# Patient Record
Sex: MALE | Race: WHITE
[De-identification: names, ages, dates, MRNs, and addresses within clinical notes are randomized per-mention and may not be internally consistent; named-entity substitution may affect disease eponyms.]

---

## 2017-05-06 ENCOUNTER — HOSPITAL ENCOUNTER (INPATIENT)
Dept: HOSPITAL 35 - ER | Age: 62
LOS: 1 days | Discharge: LEFT BEFORE DISCHARGE | DRG: 313 | End: 2017-05-07
Attending: INTERNAL MEDICINE | Admitting: INTERNAL MEDICINE
Payer: COMMERCIAL

## 2017-05-06 VITALS — DIASTOLIC BLOOD PRESSURE: 75 MMHG | SYSTOLIC BLOOD PRESSURE: 173 MMHG

## 2017-05-06 VITALS — HEIGHT: 70 IN | WEIGHT: 179 LBS | BODY MASS INDEX: 25.62 KG/M2

## 2017-05-06 DIAGNOSIS — Z91.040: ICD-10-CM

## 2017-05-06 DIAGNOSIS — E11.65: ICD-10-CM

## 2017-05-06 DIAGNOSIS — I10: ICD-10-CM

## 2017-05-06 DIAGNOSIS — J44.1: ICD-10-CM

## 2017-05-06 DIAGNOSIS — Z95.5: ICD-10-CM

## 2017-05-06 DIAGNOSIS — I25.2: ICD-10-CM

## 2017-05-06 DIAGNOSIS — Z53.21: ICD-10-CM

## 2017-05-06 DIAGNOSIS — Z71.6: ICD-10-CM

## 2017-05-06 DIAGNOSIS — Z79.899: ICD-10-CM

## 2017-05-06 DIAGNOSIS — Z88.6: ICD-10-CM

## 2017-05-06 DIAGNOSIS — Z79.82: ICD-10-CM

## 2017-05-06 DIAGNOSIS — E78.5: ICD-10-CM

## 2017-05-06 DIAGNOSIS — Z91.048: ICD-10-CM

## 2017-05-06 DIAGNOSIS — Z91.041: ICD-10-CM

## 2017-05-06 DIAGNOSIS — Z76.5: ICD-10-CM

## 2017-05-06 DIAGNOSIS — F17.290: ICD-10-CM

## 2017-05-06 DIAGNOSIS — I25.10: ICD-10-CM

## 2017-05-06 DIAGNOSIS — Z88.8: ICD-10-CM

## 2017-05-06 DIAGNOSIS — Z79.4: ICD-10-CM

## 2017-05-06 DIAGNOSIS — Z88.0: ICD-10-CM

## 2017-05-06 DIAGNOSIS — R07.89: Primary | ICD-10-CM

## 2017-05-06 LAB
ANION GAP SERPL CALC-SCNC: 8 MMOL/L (ref 7–16)
BASOPHILS NFR BLD AUTO: 1.9 % (ref 0–2)
BUN SERPL-MCNC: 10 MG/DL (ref 7–18)
CALCIUM SERPL-MCNC: 8.9 MG/DL (ref 8.5–10.1)
CHLORIDE SERPL-SCNC: 104 MMOL/L (ref 98–107)
CO2 SERPL-SCNC: 25 MMOL/L (ref 21–32)
CREAT SERPL-MCNC: 0.9 MG/DL (ref 0.7–1.3)
EOSINOPHIL NFR BLD: 4.8 % (ref 0–3)
ERYTHROCYTE [DISTWIDTH] IN BLOOD BY AUTOMATED COUNT: 15.9 % (ref 10.5–14.5)
GLUCOSE SERPL-MCNC: 242 MG/DL (ref 74–106)
GRANULOCYTES NFR BLD MANUAL: 50.7 % (ref 36–66)
HCT VFR BLD CALC: 38.6 % (ref 42–52)
HGB BLD-MCNC: 12.8 GM/DL (ref 14–18)
LYMPHOCYTES NFR BLD AUTO: 37.8 % (ref 24–44)
MANUAL DIFFERENTIAL PERFORMED BLD QL: NO
MCH RBC QN AUTO: 26.9 PG (ref 26–34)
MCHC RBC AUTO-ENTMCNC: 33.1 G/DL (ref 28–37)
MCV RBC: 81 FL (ref 80–100)
MONOCYTES NFR BLD: 4.8 % (ref 1–8)
NEUTROPHILS # BLD: 4.3 THOU/UL (ref 1.4–8.2)
PLATELET # BLD: 413 THOU/UL (ref 150–400)
POTASSIUM SERPL-SCNC: 4.2 MMOL/L (ref 3.5–5.1)
RBC # BLD AUTO: 4.76 MIL/UL (ref 4.5–6)
SODIUM SERPL-SCNC: 138 MMOL/L (ref 136–145)
TROPONIN I SERPL-MCNC: < 0.04 NG/ML
WBC # BLD AUTO: 8.5 THOU/UL (ref 4–11)

## 2017-05-06 PROCEDURE — 10045: CPT

## 2017-05-07 VITALS — DIASTOLIC BLOOD PRESSURE: 69 MMHG | SYSTOLIC BLOOD PRESSURE: 114 MMHG

## 2017-05-07 VITALS — SYSTOLIC BLOOD PRESSURE: 140 MMHG | DIASTOLIC BLOOD PRESSURE: 86 MMHG

## 2017-05-07 VITALS — SYSTOLIC BLOOD PRESSURE: 140 MMHG | DIASTOLIC BLOOD PRESSURE: 69 MMHG

## 2017-05-07 VITALS — SYSTOLIC BLOOD PRESSURE: 150 MMHG | DIASTOLIC BLOOD PRESSURE: 73 MMHG

## 2017-05-07 LAB
APTT BLD: 26.3 SECONDS (ref 24.5–32.8)
CK-MB MASS: 7.6 NG/ML
INR PPP: 1
PROTHROMBIN TIME: 10.2 SECONDS (ref 9.3–11.4)
TROPONIN I SERPL-MCNC: < 0.04 NG/ML

## 2017-05-27 ENCOUNTER — HOSPITAL ENCOUNTER (INPATIENT)
Dept: HOSPITAL 61 - ER | Age: 62
LOS: 1 days | Discharge: HOME | DRG: 392 | End: 2017-05-28
Attending: INTERNAL MEDICINE | Admitting: INTERNAL MEDICINE
Payer: MEDICAID

## 2017-05-27 VITALS — WEIGHT: 180 LBS | BODY MASS INDEX: 25.77 KG/M2 | HEIGHT: 70 IN

## 2017-05-27 DIAGNOSIS — Z88.0: ICD-10-CM

## 2017-05-27 DIAGNOSIS — E44.0: ICD-10-CM

## 2017-05-27 DIAGNOSIS — Z88.8: ICD-10-CM

## 2017-05-27 DIAGNOSIS — Z95.5: ICD-10-CM

## 2017-05-27 DIAGNOSIS — I25.119: ICD-10-CM

## 2017-05-27 DIAGNOSIS — Z88.5: ICD-10-CM

## 2017-05-27 DIAGNOSIS — E11.9: ICD-10-CM

## 2017-05-27 DIAGNOSIS — I50.9: ICD-10-CM

## 2017-05-27 DIAGNOSIS — E78.5: ICD-10-CM

## 2017-05-27 DIAGNOSIS — K21.9: Primary | ICD-10-CM

## 2017-05-27 DIAGNOSIS — Z86.73: ICD-10-CM

## 2017-05-27 DIAGNOSIS — I11.0: ICD-10-CM

## 2017-05-27 DIAGNOSIS — Z79.82: ICD-10-CM

## 2017-05-27 DIAGNOSIS — Z82.49: ICD-10-CM

## 2017-05-27 LAB
ALBUMIN SERPL-MCNC: 3.1 G/DL (ref 3.4–5)
ALP SERPL-CCNC: 87 U/L (ref 46–116)
ALT SERPL-CCNC: 20 U/L (ref 16–63)
ANION GAP SERPL CALC-SCNC: 10 MMOL/L (ref 6–14)
AST SERPL-CCNC: 12 U/L (ref 15–37)
BASOPHILS # BLD AUTO: 0.1 X10^3/UL (ref 0–0.2)
BASOPHILS NFR BLD: 1 % (ref 0–3)
BILIRUB DIRECT SERPL-MCNC: 0.1 MG/DL (ref 0–0.2)
BILIRUB SERPL-MCNC: 0.1 MG/DL (ref 0.2–1)
BUN SERPL-MCNC: 7 MG/DL (ref 8–26)
CALCIUM SERPL-MCNC: 9 MG/DL (ref 8.5–10.1)
CHLORIDE SERPL-SCNC: 106 MMOL/L (ref 98–107)
CO2 SERPL-SCNC: 28 MMOL/L (ref 21–32)
CREAT SERPL-MCNC: 0.9 MG/DL (ref 0.7–1.3)
EOSINOPHIL NFR BLD: 6 % (ref 0–3)
ERYTHROCYTE [DISTWIDTH] IN BLOOD BY AUTOMATED COUNT: 15.7 % (ref 11.5–14.5)
GFR SERPLBLD BASED ON 1.73 SQ M-ARVRAT: 85.8 ML/MIN
GLUCOSE SERPL-MCNC: 231 MG/DL (ref 70–99)
HCT VFR BLD CALC: 38.5 % (ref 39–53)
HGB BLD-MCNC: 12.4 G/DL (ref 13–17.5)
LYMPHOCYTES # BLD: 2.9 X10^3/UL (ref 1–4.8)
LYMPHOCYTES NFR BLD AUTO: 42 % (ref 24–48)
MCH RBC QN AUTO: 27 PG (ref 25–35)
MCHC RBC AUTO-ENTMCNC: 32 G/DL (ref 31–37)
MCV RBC AUTO: 83 FL (ref 79–100)
MONOCYTES NFR BLD: 6 % (ref 0–9)
NEUTROPHILS NFR BLD AUTO: 46 % (ref 31–73)
PLATELET # BLD AUTO: 439 X10^3/UL (ref 140–400)
POTASSIUM SERPL-SCNC: 4.1 MMOL/L (ref 3.5–5.1)
PROT SERPL-MCNC: 7.3 G/DL (ref 6.4–8.2)
RBC # BLD AUTO: 4.65 X10^6/UL (ref 4.3–5.7)
SODIUM SERPL-SCNC: 144 MMOL/L (ref 136–145)
WBC # BLD AUTO: 6.9 X10^3/UL (ref 4–11)

## 2017-05-27 PROCEDURE — 85027 COMPLETE CBC AUTOMATED: CPT

## 2017-05-27 PROCEDURE — 96376 TX/PRO/DX INJ SAME DRUG ADON: CPT

## 2017-05-27 PROCEDURE — 84484 ASSAY OF TROPONIN QUANT: CPT

## 2017-05-27 PROCEDURE — 80048 BASIC METABOLIC PNL TOTAL CA: CPT

## 2017-05-27 PROCEDURE — 71010: CPT

## 2017-05-27 PROCEDURE — 80076 HEPATIC FUNCTION PANEL: CPT

## 2017-05-27 PROCEDURE — 93005 ELECTROCARDIOGRAM TRACING: CPT

## 2017-05-27 PROCEDURE — 83690 ASSAY OF LIPASE: CPT

## 2017-05-27 PROCEDURE — 96374 THER/PROPH/DIAG INJ IV PUSH: CPT

## 2017-05-27 PROCEDURE — 83880 ASSAY OF NATRIURETIC PEPTIDE: CPT

## 2017-05-27 PROCEDURE — 82962 GLUCOSE BLOOD TEST: CPT

## 2017-05-27 PROCEDURE — 96375 TX/PRO/DX INJ NEW DRUG ADDON: CPT

## 2017-05-27 PROCEDURE — 36415 COLL VENOUS BLD VENIPUNCTURE: CPT

## 2017-05-27 RX ADMIN — ENOXAPARIN SODIUM SCH MG: 80 INJECTION SUBCUTANEOUS at 23:35

## 2017-05-27 RX ADMIN — MORPHINE SULFATE PRN MG: 4 INJECTION, SOLUTION INTRAMUSCULAR; INTRAVENOUS at 21:40

## 2017-05-27 RX ADMIN — MORPHINE SULFATE PRN MG: 4 INJECTION, SOLUTION INTRAMUSCULAR; INTRAVENOUS at 22:34

## 2017-05-27 RX ADMIN — MORPHINE SULFATE PRN MG: 4 INJECTION, SOLUTION INTRAMUSCULAR; INTRAVENOUS at 21:57

## 2017-05-27 RX ADMIN — MORPHINE SULFATE PRN MG: 4 INJECTION, SOLUTION INTRAMUSCULAR; INTRAVENOUS at 23:28

## 2017-05-27 RX ADMIN — MORPHINE SULFATE PRN MG: 4 INJECTION, SOLUTION INTRAMUSCULAR; INTRAVENOUS at 22:14

## 2017-05-28 VITALS — SYSTOLIC BLOOD PRESSURE: 122 MMHG | DIASTOLIC BLOOD PRESSURE: 48 MMHG

## 2017-05-28 VITALS — SYSTOLIC BLOOD PRESSURE: 124 MMHG | DIASTOLIC BLOOD PRESSURE: 78 MMHG

## 2017-05-28 VITALS — DIASTOLIC BLOOD PRESSURE: 71 MMHG | SYSTOLIC BLOOD PRESSURE: 109 MMHG

## 2017-05-28 VITALS — DIASTOLIC BLOOD PRESSURE: 67 MMHG | SYSTOLIC BLOOD PRESSURE: 104 MMHG

## 2017-05-28 LAB
ANION GAP SERPL CALC-SCNC: 8 MMOL/L (ref 6–14)
BASOPHILS # BLD AUTO: 0.1 X10^3/UL (ref 0–0.2)
BASOPHILS NFR BLD: 1 % (ref 0–3)
BUN SERPL-MCNC: 7 MG/DL (ref 8–26)
CALCIUM SERPL-MCNC: 8.8 MG/DL (ref 8.5–10.1)
CHLORIDE SERPL-SCNC: 110 MMOL/L (ref 98–107)
CO2 SERPL-SCNC: 29 MMOL/L (ref 21–32)
CREAT SERPL-MCNC: 0.8 MG/DL (ref 0.7–1.3)
EOSINOPHIL NFR BLD: 6 % (ref 0–3)
ERYTHROCYTE [DISTWIDTH] IN BLOOD BY AUTOMATED COUNT: 16 % (ref 11.5–14.5)
GFR SERPLBLD BASED ON 1.73 SQ M-ARVRAT: 98.3 ML/MIN
GLUCOSE SERPL-MCNC: 66 MG/DL (ref 70–99)
HCT VFR BLD CALC: 36.9 % (ref 39–53)
HGB BLD-MCNC: 11.8 G/DL (ref 13–17.5)
LYMPHOCYTES # BLD: 3.1 X10^3/UL (ref 1–4.8)
LYMPHOCYTES NFR BLD AUTO: 47 % (ref 24–48)
MCH RBC QN AUTO: 26 PG (ref 25–35)
MCHC RBC AUTO-ENTMCNC: 32 G/DL (ref 31–37)
MCV RBC AUTO: 82 FL (ref 79–100)
MONOCYTES NFR BLD: 4 % (ref 0–9)
NEUTROPHILS NFR BLD AUTO: 41 % (ref 31–73)
PLATELET # BLD AUTO: 452 X10^3/UL (ref 140–400)
POTASSIUM SERPL-SCNC: 4.8 MMOL/L (ref 3.5–5.1)
RBC # BLD AUTO: 4.49 X10^6/UL (ref 4.3–5.7)
SODIUM SERPL-SCNC: 147 MMOL/L (ref 136–145)
WBC # BLD AUTO: 6.7 X10^3/UL (ref 4–11)

## 2017-05-28 RX ADMIN — INSULIN ASPART SCH UNITS: 100 INJECTION, SOLUTION INTRAVENOUS; SUBCUTANEOUS at 12:00

## 2017-05-28 RX ADMIN — MORPHINE SULFATE PRN MG: 4 INJECTION, SOLUTION INTRAMUSCULAR; INTRAVENOUS at 03:14

## 2017-05-28 RX ADMIN — INSULIN ASPART SCH UNITS: 100 INJECTION, SOLUTION INTRAVENOUS; SUBCUTANEOUS at 08:00

## 2017-05-28 RX ADMIN — MORPHINE SULFATE PRN MG: 4 INJECTION, SOLUTION INTRAMUSCULAR; INTRAVENOUS at 01:22

## 2017-05-28 RX ADMIN — MORPHINE SULFATE PRN MG: 4 INJECTION, SOLUTION INTRAMUSCULAR; INTRAVENOUS at 12:05

## 2017-05-28 RX ADMIN — ENOXAPARIN SODIUM SCH MG: 80 INJECTION SUBCUTANEOUS at 08:52

## 2017-05-28 RX ADMIN — MORPHINE SULFATE PRN MG: 4 INJECTION, SOLUTION INTRAMUSCULAR; INTRAVENOUS at 09:36

## 2017-05-28 RX ADMIN — MORPHINE SULFATE PRN MG: 4 INJECTION, SOLUTION INTRAMUSCULAR; INTRAVENOUS at 07:48

## 2017-05-28 RX ADMIN — MORPHINE SULFATE PRN MG: 4 INJECTION, SOLUTION INTRAMUSCULAR; INTRAVENOUS at 05:39

## 2017-05-28 NOTE — PDOC2
CONSULT


Date of Consult


Date of Consult


DATE: 5/28/17 


TIME: 10:46





Reason for Consult


Reason for Consult:


Chest pain





Referring Physician


Referring Physician:


Dr. Page





Identification/Chief Complaint


Chief Complaint


Chest pain





Source


Source:  Chart review, Patient





History of Present Illness


Reason for Visit:


61-year-old male with history of coronary artery disease s/p PCI/stent in 2008 

who usually follows up with Dr. Carrillo at [facility] presented 

complaining of left-sided chest pain that started 15 minutes prior to 

presentation while he was shopping. He described the pain as sharp and pressure-

like at the same time and also stated the pain was different than the pain he 

had prior to his angioplasty. He denied any orthopnea/PND, palpitations or 

syncope.





Past Medical History


Cardiovascular:  CAD, HTN, Hyperlipidemia


Musculoskeletal:   low back pain


Endocrine:  Diabetes





Past Surgical History


Past Surgical History:  Other





Family History


Family History:  Hypertension





Social History


ALCOHOL:  occassional





Current Problem List


Problem List


Problems


Medical Problems:


(1) Chest pain


Status: Acute  











Current Medications


Current Medications





Current Medications


Morphine Sulfate 4 mg PRN Q15MIN  PRN IV/SQ PAIN GREATER THAN 3/10 Last 

administered on 5/27/17at 22:34;  Start 5/27/17 at 21:30;  Stop 5/28/17 at 21:29


Morphine Sulfate 4 mg STK-MED ONCE .ROUTE ;  Start 5/27/17 at 21:37;  Stop 5/27/ 17 at 21:38;  Status DC


Ondansetron HCl (Zofran) 4 mg 1X  ONCE IV  Last administered on 5/27/17at 21:56

;  Start 5/27/17 at 22:00;  Stop 5/27/17 at 22:01;  Status DC


Ondansetron HCl (Zofran) 4 mg PRN Q8HRS  PRN IV NAUSEA/VOMITING Last 

administered on 5/27/17at 23:34;  Start 5/27/17 at 22:45;  Stop 5/28/17 at 22:44


Morphine Sulfate 4 mg PRN Q2HR  PRN IV SEVERE PAIN Last administered on 5/28/ 17at 09:36;  Start 5/27/17 at 22:45;  Stop 5/28/17 at 22:44


Acetaminophen (Tylenol) 650 mg PRN Q4HRS  PRN PO FEVER;  Start 5/27/17 at 22:45

;  Stop 5/28/17 at 22:44


Insulin Aspart (NovoLOG) 0-5 UNITS TIDWMEALS SQ ;  Start 5/28/17 at 08:00


Dextrose (Dextrose 50%-Water Syringe) 12.5 gm PRN Q15MIN  PRN IV SEE COMMENTS;  

Start 5/27/17 at 22:45


Enoxaparin Sodium (Lovenox Per Pharmacy Treatment Dosing) 1 each PRN DAILY  PRN 

MC DVT TREATMENT;  Start 5/27/17 at 23:30


Enoxaparin Sodium (Lovenox 80mg Syringe) 80 mg Q12HR SQ  Last administered on 5/ 28/17at 08:52;  Start 5/27/17 at 23:45


Info (Anti-Coagulation Monitoring By Pharmacy) 1 each PRN DAILY  PRN MC SEE 

COMMENTS Last administered on 5/28/17at 00:55;  Start 5/27/17 at 23:45





Active Scripts


Active


Reported


Lisinopril 5 Mg Tablet 5 Mg PO DAILY


Aspir 81 (Aspirin) 81 Mg Tablet.dr 1 Tab PO DAILY


Zoloft (Sertraline Hcl) 25 Mg Tablet 25 Mg PO DAILY


Clopidogrel (Clopidogrel Bisulfate) 75 Mg Tablet 1 Tab PO DAILY


Symbicort 80-4.5 Mcg Inhaler (Budesonide/Formoterol Fumarate) 10.2 Gm 

Hfa.aer.ad 2 Puff IH BID


Lantus (Insulin Glargine,Hum.rec.anlog) 100 Unit/1 Ml Vial 45 Unit SQ HS


Humulin N Kwikpen (Nph, Human Insulin Isophane) 100 Unit/1 Ml Insuln.pen 10 

Unit SQ TIDAC


Metoprolol Tartrate 25 Mg Tablet 25 Mg PO BID


Gabapentin 300 Mg Capsule 300 Mg PO Q6HRS


Flomax (Tamsulosin Hcl) 0.4 Mg Cap.er.24h 0.4 Mg PO QHS


Metformin Hcl 1,000 Mg Tablet 1,000 Mg PO BIDWMEALS





Allergies


Allergies:  


Coded Allergies:  


     Iodinated Contrast Media - Oral and (Unverified  Allergy, Severe, 

"Breathing issues", 12/31/15)


     nitroglycerin (Verified  Allergy, Severe, "Pretty much unresponsive right 

away", 1/15/16)


 Low blood pressure


     Penicillins (Verified  Allergy, Intermediate, Hives , 12/31/15)


     codeine (Verified  Allergy, Intermediate, 5/27/17)


     fentanyl (Verified  Allergy, Intermediate, rash, 12/31/15)


     ketorolac (Verified  Allergy, Intermediate, 5/27/17)


     methylprednisolone (Verified  Allergy, Intermediate, 5/27/17)





ROS


PSYCHOLOGICAL ROS:  No: Hallucinations


Eyes:  No Loss of vision


HEENT:  No: Epistaxis


ENDOCRINE:  No: Palpitations


Respiratory:  YES: Shortness of breath, 


   No: Hemoptysis


Cardiovascular:  yes Chest Pain


Gastrointestinal:  No Vomiting, No Diarrhea


Genitourinary:  No Hematuria


Neurological:  No Seizures


Skin:  No Rash





Physical Exam


General:  Alert, Oriented X3


HEENT:  Atraumatic, PERRLA


Lungs:  Clear to auscultation


Heart:  Regular rate, No murmurs


Abdomen:  Soft, No tenderness


Extremities:  No edema


Psych/Mental Status:  Mood NL





Vitals


VITALS





Vital Signs








  Date Time  Temp Pulse Resp B/P (MAP) Pulse Ox O2 Delivery O2 Flow Rate FiO2


 


5/28/17 10:26 98.1 84 19 104/67 (79) 90 Room Air  





 98.1       


 


5/28/17 09:36       2.0 











Labs


Labs





Laboratory Tests








Test


  5/27/17


20:55 5/28/17


03:30 5/28/17


05:42 5/28/17


07:29


 


White Blood Count


  6.9 x10^3/uL


(4.0-11.0) 6.7 x10^3/uL


(4.0-11.0) 


  


 


 


Red Blood Count


  4.65 x10^6/uL


(4.30-5.70) 4.49 x10^6/uL


(4.30-5.70) 


  


 


 


Hemoglobin


  12.4 g/dL


(13.0-17.5) 11.8 g/dL


(13.0-17.5) 


  


 


 


Hematocrit


  38.5 %


(39.0-53.0) 36.9 %


(39.0-53.0) 


  


 


 


Mean Corpuscular Volume 83 fL ()  82 fL ()   


 


Mean Corpuscular Hemoglobin 27 pg (25-35)  26 pg (25-35)   


 


Mean Corpuscular Hemoglobin


Concent 32 g/dL


(31-37) 32 g/dL


(31-37) 


  


 


 


Red Cell Distribution Width


  15.7 %


(11.5-14.5) 16.0 %


(11.5-14.5) 


  


 


 


Platelet Count


  439 x10^3/uL


(140-400) 452 x10^3/uL


(140-400) 


  


 


 


Neutrophils (%) (Auto) 46 % (31-73)  41 % (31-73)   


 


Lymphocytes (%) (Auto) 42 % (24-48)  47 % (24-48)   


 


Monocytes (%) (Auto) 6 % (0-9)  4 % (0-9)   


 


Eosinophils (%) (Auto) 6 % (0-3)  6 % (0-3)   


 


Basophils (%) (Auto) 1 % (0-3)  1 % (0-3)   


 


Neutrophils # (Auto)


  3.1 x10^3uL


(1.8-7.7) 2.8 x10^3uL


(1.8-7.7) 


  


 


 


Lymphocytes # (Auto)


  2.9 x10^3/uL


(1.0-4.8) 3.1 x10^3/uL


(1.0-4.8) 


  


 


 


Monocytes # (Auto)


  0.4 x10^3/uL


(0.0-1.1) 0.3 x10^3/uL


(0.0-1.1) 


  


 


 


Eosinophils # (Auto)


  0.4 x10^3/uL


(0.0-0.7) 0.4 x10^3/uL


(0.0-0.7) 


  


 


 


Basophils # (Auto)


  0.1 x10^3/uL


(0.0-0.2) 0.1 x10^3/uL


(0.0-0.2) 


  


 


 


Sodium Level


  144 mmol/L


(136-145) 147 mmol/L


(136-145) 


  


 


 


Potassium Level


  4.1 mmol/L


(3.5-5.1) 4.8 mmol/L


(3.5-5.1) 


  


 


 


Chloride Level


  106 mmol/L


() 110 mmol/L


() 


  


 


 


Carbon Dioxide Level


  28 mmol/L


(21-32) 29 mmol/L


(21-32) 


  


 


 


Anion Gap 10 (6-14)  8 (6-14)   


 


Blood Urea Nitrogen 7 mg/dL (8-26)  7 mg/dL (8-26)   


 


Creatinine


  0.9 mg/dL


(0.7-1.3) 0.8 mg/dL


(0.7-1.3) 


  


 


 


Estimated GFR


(Cockcroft-Gault) 85.8 


  98.3 


  


  


 


 


Glucose Level


  231 mg/dL


(70-99) 66 mg/dL


(70-99) 


  


 


 


Calcium Level


  9.0 mg/dL


(8.5-10.1) 8.8 mg/dL


(8.5-10.1) 


  


 


 


Total Bilirubin


  0.1 mg/dL


(0.2-1.0) 


  


  


 


 


Direct Bilirubin


  0.1 mg/dL


(0.0-0.2) 


  


  


 


 


Aspartate Amino Transf


(AST/SGOT) 12 U/L (15-37) 


  


  


  


 


 


Alanine Aminotransferase


(ALT/SGPT) 20 U/L (16-63) 


  


  


  


 


 


Alkaline Phosphatase


  87 U/L


() 


  


  


 


 


Troponin I Quantitative


  < 0.017 ng/mL


(0.000-0.055) 0.021 ng/mL


(0.000-0.055) 


  


 


 


NT-Pro-B-Type Natriuretic


Peptide 337 pg/mL


(0-124) 


  


  


 


 


Total Protein


  7.3 g/dL


(6.4-8.2) 


  


  


 


 


Albumin


  3.1 g/dL


(3.4-5.0) 


  


  


 


 


Lipase


  102 U/L


() 


  


  


 


 


Glucose (Fingerstick)


  


  


  88 mg/dL


(70-99) 70 mg/dL


(70-99)


 


Test


  5/28/17


09:00 


  


  


 


 


Troponin I Quantitative


  0.026 ng/mL


(0.000-0.055) 


  


  


 








Laboratory Tests








Test


  5/27/17


20:55 5/28/17


03:30 5/28/17


05:42 5/28/17


07:29


 


White Blood Count


  6.9 x10^3/uL


(4.0-11.0) 6.7 x10^3/uL


(4.0-11.0) 


  


 


 


Red Blood Count


  4.65 x10^6/uL


(4.30-5.70) 4.49 x10^6/uL


(4.30-5.70) 


  


 


 


Hemoglobin


  12.4 g/dL


(13.0-17.5) 11.8 g/dL


(13.0-17.5) 


  


 


 


Hematocrit


  38.5 %


(39.0-53.0) 36.9 %


(39.0-53.0) 


  


 


 


Mean Corpuscular Volume 83 fL ()  82 fL ()   


 


Mean Corpuscular Hemoglobin 27 pg (25-35)  26 pg (25-35)   


 


Mean Corpuscular Hemoglobin


Concent 32 g/dL


(31-37) 32 g/dL


(31-37) 


  


 


 


Red Cell Distribution Width


  15.7 %


(11.5-14.5) 16.0 %


(11.5-14.5) 


  


 


 


Platelet Count


  439 x10^3/uL


(140-400) 452 x10^3/uL


(140-400) 


  


 


 


Neutrophils (%) (Auto) 46 % (31-73)  41 % (31-73)   


 


Lymphocytes (%) (Auto) 42 % (24-48)  47 % (24-48)   


 


Monocytes (%) (Auto) 6 % (0-9)  4 % (0-9)   


 


Eosinophils (%) (Auto) 6 % (0-3)  6 % (0-3)   


 


Basophils (%) (Auto) 1 % (0-3)  1 % (0-3)   


 


Neutrophils # (Auto)


  3.1 x10^3uL


(1.8-7.7) 2.8 x10^3uL


(1.8-7.7) 


  


 


 


Lymphocytes # (Auto)


  2.9 x10^3/uL


(1.0-4.8) 3.1 x10^3/uL


(1.0-4.8) 


  


 


 


Monocytes # (Auto)


  0.4 x10^3/uL


(0.0-1.1) 0.3 x10^3/uL


(0.0-1.1) 


  


 


 


Eosinophils # (Auto)


  0.4 x10^3/uL


(0.0-0.7) 0.4 x10^3/uL


(0.0-0.7) 


  


 


 


Basophils # (Auto)


  0.1 x10^3/uL


(0.0-0.2) 0.1 x10^3/uL


(0.0-0.2) 


  


 


 


Sodium Level


  144 mmol/L


(136-145) 147 mmol/L


(136-145) 


  


 


 


Potassium Level


  4.1 mmol/L


(3.5-5.1) 4.8 mmol/L


(3.5-5.1) 


  


 


 


Chloride Level


  106 mmol/L


() 110 mmol/L


() 


  


 


 


Carbon Dioxide Level


  28 mmol/L


(21-32) 29 mmol/L


(21-32) 


  


 


 


Anion Gap 10 (6-14)  8 (6-14)   


 


Blood Urea Nitrogen 7 mg/dL (8-26)  7 mg/dL (8-26)   


 


Creatinine


  0.9 mg/dL


(0.7-1.3) 0.8 mg/dL


(0.7-1.3) 


  


 


 


Estimated GFR


(Cockcroft-Gault) 85.8 


  98.3 


  


  


 


 


Glucose Level


  231 mg/dL


(70-99) 66 mg/dL


(70-99) 


  


 


 


Calcium Level


  9.0 mg/dL


(8.5-10.1) 8.8 mg/dL


(8.5-10.1) 


  


 


 


Total Bilirubin


  0.1 mg/dL


(0.2-1.0) 


  


  


 


 


Direct Bilirubin


  0.1 mg/dL


(0.0-0.2) 


  


  


 


 


Aspartate Amino Transf


(AST/SGOT) 12 U/L (15-37) 


  


  


  


 


 


Alanine Aminotransferase


(ALT/SGPT) 20 U/L (16-63) 


  


  


  


 


 


Alkaline Phosphatase


  87 U/L


() 


  


  


 


 


Troponin I Quantitative


  < 0.017 ng/mL


(0.000-0.055) 0.021 ng/mL


(0.000-0.055) 


  


 


 


NT-Pro-B-Type Natriuretic


Peptide 337 pg/mL


(0-124) 


  


  


 


 


Total Protein


  7.3 g/dL


(6.4-8.2) 


  


  


 


 


Albumin


  3.1 g/dL


(3.4-5.0) 


  


  


 


 


Lipase


  102 U/L


() 


  


  


 


 


Glucose (Fingerstick)


  


  


  88 mg/dL


(70-99) 70 mg/dL


(70-99)


 


Test


  5/28/17


09:00 


  


  


 


 


Troponin I Quantitative


  0.026 ng/mL


(0.000-0.055) 


  


  


 











Assessment/Plan


Assessment/Plan


1.  Chest pain with atypical features:  EKG without acute changes. Patient has 

history of coronary artery disease and has undergone stent placement in 2008. 

He stated that he has seen his primary cardiologist at [facility] 

recently. We will obtain medical records. He will benefit from Lexiscan nuclear 

stress test to rule out ischemic etiology if this has not been done recently - 

he could get this done as an outpatient.


2. Hypertension:  Controlled


3.  Diabetes mellitus type 2:  Treat per IM





Thank you for your consultation 











QUIQUE HERRERA MD May 28, 2017 10:46

## 2017-05-28 NOTE — RAD
EXAM: Chest, single view.



HISTORY: Chest pain.



COMPARISON: 1/5/2016.



FINDINGS: A frontal view of the chest is obtained. There is bilateral basilar

atelectasis. There is no infiltrate, effusion or pneumothorax. The heart is

normal in size.



IMPRESSION: Bilateral basilar atelectasis.

## 2017-05-28 NOTE — EKG
[facility]

               [address] [place] [address]

Test Date:    2017               Test Time:    21:50:24

Pat Name:     BYRANNA CLAIRE              Department:   

Patient ID:   [id number]           Room:         250 1

Gender:       M                        Technician:   

:          1955               Requested By: MEME HARRISON

Order Number: [id number]            Reading MD:   Susi Batista

                                 Measurements

Intervals                              Axis          

Rate:         88                       P:            46

UT:           182                      QRS:          62

QRSD:         86                       T:            95

QT:           386                                    

QTc:          471                                    

                           Interpretive Statements

SINUS RHYTHM



QRS(T) CONTOUR ABNORMALITY

CANNOT RULE OUT ANTEROSEPTAL MYOCARDIAL DAMAGE



Electronically Signed On 2017 15:55:22 CDT by Susi Batista

## 2017-05-28 NOTE — ACF
Admit Criteria Forms


                                                CHEST PAIN





Clinical Indications for Admission to Inpatient Care





                                                                         (Place 

'X' for any and all applicable criteria): 





Admission is indicated for chest pain and ANY ONE of the following(1)(2)(3)(4)(5

): 





[X ]I.    Angina with acute coronary syndrome (Also use Myocardial Infarction 

or Angina guideline)


[ ]II.   Hemodynamic instability


[ ]III.  Angina needing acute intervention as indicated by ALL of the following(

11)(12):


        [ ]a)  Unstable angina is present as indicated by angina that is ANY ONE

 of the following:


                [ ]i)     New onset   


                [ ]ii)    Nocturnal   


                [ ]iii)   Prolonged at rest   


                [ ]iv)   Progressive


        [ ]b)  Angina warrants acute intervention as indicated by ANY ONE of 

the following:


                [ ]i)     Recurrent angina (e.g, not responding as previously 

to treatment)


                [ ]ii)     Angina at rest or with low-level activities despite 

initial medical therapy


                [ ]iii)    New or presumably new ST-segment depression on ECG


                [ ]iv)    Signs or symptoms of heart failure (eg, dyspnea, 

pulmonary edema)


                [ ]v)     New or worsening mitral regurgitation


                [ ]vi)    Hemodynamic instability


                [ ]vii)   Dangerous arrhythmia (eg, sustained ventricular 

tachycardia)          


                [ ]viii)   History of percutaneous coronary intervention within 

6 months          


                [ ]ix)    History of coronary artery bypass graft surgery


                [ ]x)    ANTHONY risk score of 2 or greater[A]


                [ ]xi)    History of Diabetes(14)


                [ ]xii)   High-risk cardiac ischemia findings on noninvasive 

testing (e.g, echocardiogram,


                          treadmill testing, nuclear scan)


                [ ]xiii)  Chronic renal insufficiency (ie, estimated GFR less 

than 60 mL/min/1.732m)


                [ ]xiv)  Left ventricular ejection fraction less than 40%


              


[ ]IV.   Evidence of MI (eg, cardiac biomarkers positive, ST-segment elevation 

on ECG) also use Myocardial Infarction Criteria Form.


[ ]V.    Pulmonary edema 


[ ]VI.   Respiratory distress


[ ]VII.  Chest pain indicative of serious diagnosis other than coronary artery 

disease (eg, aortic dissection)





[ ]VIII. Contraindications and/or Inappropriate clinical situations for 

Observational Care in patients


          with Chest Pain, when ANY ONE of the following is required:


          [ ]a)   Patient with risk factor for pulmonary embolism, acute 

coronary syndrome and myocardial infarction (18)


          [ ]b)   Patient with Pulmonary embolism require an average LOS of 4.3 

days, therefore emergency 


                   department observation management is inappropriate 18,23


          [ ]c)   Painful condition/s in the elderly, have the highest rate of 

recidivism after emergency department observation management (10.8%)  20,21,22


          [ ]d)   Elevated cardiac biomarker requires intensive and exhaustive 

care (19)


[ ]IX.  General contraindications and/or Inappropriate clinical situations for 

Observational Care in patients


          with Chest Pain, when ANY ONE of the following is required:


           [ ]a)   Prediction of prolongation of LOS based on ANY ONE of the 

following may be considered as 


                    a contraindication for observational care 2, 3, 4, 5, 6, 7, 

8, 9, 10, 11


                    [ ]i)    Age > 65 yrs.


                    [ ]ii)   Patient arriving by ambulance


                    [ ]iii)  Patient with high acuity


                    [ ]iv)  Patient requiring vital sign monitoring


                    [ ]v)   Patient on IV medication


           [ ]b)   Systolic blood pressures  180mmHg  3,12


           [ ]c)   Patient with altered mental status including delirium and 

other alteration of consciousness, (3)


           [ ]d)   Patient whose discharge disposition will be to a skilled 

nursing home or rehabilitation home should 


                    not be managed in Emergency Department Observation Unit. 

CMS rule requires 3 days hospital stay before such placement. 3,13


           [ ]e)   Patient with failure to thrive due to broad array of 

etiologies  3,16,17


           [ ]f)    Inability to ambulate  3,14








Extended stay beyond goal length of stay may be needed for (1)(28):


[ ]a)   Specific condition diagnosed after evaluation (eg, pulmonary embolism, 

aortic dissection) 


[ ]b)   Unstable angina


[ ]c)   Continued suspicion of acute coronary syndrome with inability to 

complete needed cardiac evaluation


         (eg, patient clinically unable to undergo stress testing)


[ ]d)   Myocardial infarction








(Contents from ANGINA and CHEST PAIN clinical indications for admission to 

inpatient care have been integrated in this form) 








The original [employer] content created by [employer] has been revised. 


The portions of the content which have been revised are identified through the 

use of italic text or in bold, and [facility]


has neither reviewed nor approved the modified material. All other unmodified 

content is copyright  [employer].





Please see references footnoted in the original [facility] [place] edition 

2016











GREGORIA ESCOBAR May 28, 2017 00:28

## 2017-05-28 NOTE — SSS
ADMIT DATE:  05/28/2017



CHIEF COMPLAINT:  Chest pain.



HISTORY OF PRESENT ILLNESS:  The patient is a pleasant 61-year-old male with

known coronary artery disease.  He has got two previous stents in 2008.  He

present today with chest pain, rated 7/10.  He has got associated weakness.  He

has now been admitted to telemetry floor, was consulted Dr. Morales.  He saw

the patient _____ the patient could probably go home and see his cardiologist.



PAST MEDICAL HISTORY:  Coronary artery disease, previous stents, stroke, CHF,

diabetes, hyperlipidemia, hypertension, left ankle surgery, exploratory

abdominal surgery.



ALLERGIES:  IODINE, PENICILLIN, CODEINE, FENTANYL, _____, METHYLPREDNISONE AND

NITRO.



FAMILY HISTORY:  Coronary artery disease.



SOCIAL HISTORY:  He does not drink, smoke or take drugs.



MEDICATIONS:  Reviewed, please refer to the MRAD.



REVIEW OF SYSTEMS:

GENERAL:  No history of weight change, weakness or fevers.

SKIN:  No bruising, hair changes or rashes.

EYES:  No blurred, double or loss of vision.

NOSE AND THROAT:  No history of nosebleeds, hoarseness or sore throat.

HEART:  No history of palpitations, chest pain or shortness of breath on

exertion.

LUNGS:  Denies cough, hemoptysis, wheezing or shortness of breath.

GASTROINTESTINAL:  Denies changes in appetite, nausea, vomiting, diarrhea or

constipation.

GENITOURINARY:  No history of frequency, urgency, hesitancy or nocturia.

NEUROLOGIC:  Denies history of numbness, tingling, tremor or weakness.

PSYCHIATRIC:  No history of panic, anxiety or depression.

ENDOCRINE:  No history of heat or cold intolerance, polyuria or polydipsia.

EXTREMITIES:  Denies muscle weakness, joint pain, pain on walking or stiffness.



PHYSICAL EXAMINATION:

VITAL SIGNS:  Temperature afebrile, pulse 62, respirations 18, blood pressure

104/67.

GENERAL:  He is alert, cooperative.

HEART:  Normal S1, S2.

LUNGS:  Clear.

ABDOMEN:  Soft, positive bowel sounds, obese.

EXTREMITIES:  1+ edema.

SKIN:  No rashes.

PSYCHIATRIC:  He is stable.

VASCULAR:  Good capillary refill.

ENDOCRINE:  No thyromegaly.

LYMPHATICS:  No cervical nodes.

HEMATOPOIETIC:  No bruising.



LABORATORY DATA:  White count 6.9, hemoglobin 12, platelets 439.  Electrolytes

pending.  Troponin 0.026.



ASSESSMENT AND PLAN:  Chest pain with known coronary artery disease, suspect

reflux versus possible mild angina.  Clinically, the patient is doing well. 

Agree with Dr. Morales, he could probably go home.  We will have him see his

doctor in a week.



DISPOSITION:  Home.



ACTIVITY:  As tolerated.



DIET:  Low sodium.



MEDICATIONS:  Please see the MRAD.



TOTAL TIME:  31 minutes.

 



______________________________

SAMEER GERMAIN DO



DR:  NOHEMY/tisha  JOB#:  [id number] / [id number]

DD:  05/28/2017 13:06  DT:  05/28/2017 13:31

## 2017-05-28 NOTE — EKG
[facility]

               [address] [place] [address]

Test Date:    2017               Test Time:    20:53:09

Pat Name:     BRYANNA CLAIRE              Department:   

Patient ID:   [id number]           Room:         250 1

Gender:       M                        Technician:   

:          1955               Requested By: MEME HARRISON

Order Number: [id number]            Reading MD:   Susi Batista

                                 Measurements

Intervals                              Axis          

Rate:         99                       P:            60

FL:           184                      QRS:          73

QRSD:         86                       T:            70

QT:           354                                    

QTc:          460                                    

                           Interpretive Statements

SINUS RHYTHM

INCOMPLETE RIGHT BUNDLE BRANCH BLOCK

T ABNORMALITY IN ANTERIOR LEADS



Electronically Signed On 2017 15:54:35 CDT by Susi Batista

## 2017-05-28 NOTE — ED.ADGEN
Past Medical History


Past Medical History:  CHF, CVA, Diabetes-Type II, Hypertension, MI


Past Surgical History:  Angioplasty


Additional Past Surgical Histo:  Left ankle, exploritory abd, cardiac stent


Alcohol Use:  None


Drug Use:  None





Adult General


Chief Complaint


Chief Complaint:  CHEST PAIN





HPI


HPI





Patient is a 61  year old man, history of CHF, CAD status post stent placement 

in , hypertension, hyperlipidemia, type 2 diabetes mellitus, who presents 

emergency Department with a complaint of sudden onset of left-sided chest pain 

radiating into his arm and jaw that began about 15 minutes prior to arrival in 

the emergency department. Patient states that he was shopping in a nearby store

, when he began expressing chest pain. He is brought via EMS to the emergency 

department for evaluation. He states his last stress test and catheterization 

was 10 years ago. He states that he did take aspirin prior to coming to the ED, 

he takes a daily 325 mg aspirin, he took additional 4 baby aspirin prior to 

coming to the ED today. He states that he became "unresponsive" after using 

nitroglycerin previously and was told that he should not use it in the future. 

He currently is complaining of 9 out of 10 pain in his chest, described as 

sharp and a pressure-like sensation, radiating into his arm and jaw. Mild 

shortness of breath and nausea associated with this discomfort. He denies any 

recent travel or surgery, any swelling extremities, any missed doses of 

medication or new medications, and history of DVT or PE, any fevers or chills, 

any cough, any weakness, numbness, tingling, back pain or flank pain, any 

urinary complaints, any preceding symptoms.





Review of Systems


Review of Systems


Constitutional:  Denies fever or chills. []


Eyes:  Denies change in visual acuity. []


HENT:  Denies nasal congestion or sore throat. [] 


Respiratory:  Denies cough, shortness of breath associated with chest pain.


Cardiovascular: Left-sided chest pain radiating into the left jaw and left arm. 

No edema. Associated with mild shortness of breath and lightheadedness.


GI:  Denies abdominal pain, nausea, vomiting, bloody stools or diarrhea. [] 


:  Denies dysuria. [] 


Musculoskeletal:  Denies back pain or joint pain. [] 


Integument:  Denies rash. [] 


Neurologic:  Denies headache, focal weakness or sensory changes. [] 


Endocrine:  Denies polyuria or polydipsia. [] 


Lymphatic:  Denies swollen glands. [] 


Psychiatric:  Denies depression or anxiety. []





Current Medications


Current Medications





Current Medications








 Medications


  (Trade)  Dose


 Ordered  Sig/Yennifer  Start Time


 Stop Time Status Last Admin


Dose Admin


 


 Morphine Sulfate  4 mg  STK-MED ONCE  17 21:37


 17 21:38 DC  


 


 


 Ondansetron HCl


  (Zofran)  4 mg  1X  ONCE  17 22:00


 17 22:01 DC 17 21:56


4 MG











Allergies


Allergies





Allergies








Coded Allergies Type Severity Reaction Last Updated Verified


 


  Iodinated Contrast Media - Oral and Allergy Severe "Breathing issues" 12/31/

15 No


 


  nitroglycerin Allergy Severe "Pretty much unresponsive right away" 1/15/16 Yes


 


  Penicillins Allergy Intermediate Hives  12/31/15 Yes


 


  codeine Allergy Intermediate  17 Yes


 


  fentanyl Allergy Intermediate rash 12/31/15 Yes


 


  ketorolac Allergy Intermediate  17 Yes


 


  methylprednisolone Allergy Intermediate  17 Yes











Physical Exam


Physical Exam





Constitutional: Well developed, well nourished, no acute distress, non-toxic 

appearance. []


HENT: Normocephalic, atraumatic, bilateral external ears normal, oropharynx 

moist, no oral exudates, nose normal. []


Eyes: PERRLA, EOMI, conjunctiva normal, no discharge. [] 


Neck: Normal range of motion, no tenderness, supple, no stridor. [] 


Cardiovascular:Heart rate regular rhythm, no murmur , S1, S2, rubs or gallops. [

]


Lungs & Thorax:  Bilateral breath sounds clear to auscultation , no wheezing, 

rhonchi, rales. No chest wall tenderness or crepitus. Unable to reproduce 

symptoms with palpation. []


Abdomen: Bowel sounds normal, soft, no tenderness, no rebound, rigidity, no 

guarding, no masses, no pulsatile masses. [] 


Skin: Warm, dry, no erythema, no rash. [] 


Back: No tenderness, no CVA tenderness. [] 


Extremities: No tenderness, no cyanosis, no clubbing, ROM intact, no edema. 

Negative Homans sign. [] 


Neurologic: Alert and oriented X 3, normal motor function, normal sensory 

function, no focal deficits noted. []


Psychologic: Affect normal, judgement normal, mood normal. []





Current Patient Data


Vital Signs





 Vital Signs








  Date Time  Temp Pulse Resp B/P (MAP) Pulse Ox O2 Delivery O2 Flow Rate FiO2


 


17 22:00  86 20 118/61 (80) 95 Room Air  


 


17 20:53 98.7       





 98.7       








Lab Values





 Laboratory Tests








Test


  17


20:55


 


White Blood Count


  6.9 x10^3/uL


(4.0-11.0)


 


Red Blood Count


  4.65 x10^6/uL


(4.30-5.70)


 


Hemoglobin


  12.4 g/dL


(13.0-17.5)  L


 


Hematocrit


  38.5 %


(39.0-53.0)  L


 


Mean Corpuscular Volume


  83 fL ()


 


 


Mean Corpuscular Hemoglobin 27 pg (25-35)  


 


Mean Corpuscular Hemoglobin


Concent 32 g/dL


(31-37)


 


Red Cell Distribution Width


  15.7 %


(11.5-14.5)  H


 


Platelet Count


  439 x10^3/uL


(140-400)  H


 


Neutrophils (%) (Auto) 46 % (31-73)  


 


Lymphocytes (%) (Auto) 42 % (24-48)  


 


Monocytes (%) (Auto) 6 % (0-9)  


 


Eosinophils (%) (Auto) 6 % (0-3)  H


 


Basophils (%) (Auto) 1 % (0-3)  


 


Neutrophils # (Auto)


  3.1 x10^3uL


(1.8-7.7)


 


Lymphocytes # (Auto)


  2.9 x10^3/uL


(1.0-4.8)


 


Monocytes # (Auto)


  0.4 x10^3/uL


(0.0-1.1)


 


Eosinophils # (Auto)


  0.4 x10^3/uL


(0.0-0.7)


 


Basophils # (Auto)


  0.1 x10^3/uL


(0.0-0.2)


 


Sodium Level


  144 mmol/L


(136-145)


 


Potassium Level


  4.1 mmol/L


(3.5-5.1)


 


Chloride Level


  106 mmol/L


()


 


Carbon Dioxide Level


  28 mmol/L


(21-32)


 


Anion Gap 10 (6-14)  


 


Blood Urea Nitrogen


  7 mg/dL (8-26)


L


 


Creatinine


  0.9 mg/dL


(0.7-1.3)


 


Estimated GFR


(Cockcroft-Gault) 85.8  


 


 


Glucose Level


  231 mg/dL


(70-99)  H


 


Calcium Level


  9.0 mg/dL


(8.5-10.1)


 


Total Bilirubin


  0.1 mg/dL


(0.2-1.0)  L


 


Direct Bilirubin


  0.1 mg/dL


(0.0-0.2)


 


Aspartate Amino Transferase


(AST) 12 U/L (15-37)


L


 


Alanine Aminotransferase (ALT)


  20 U/L (16-63)


 


 


Alkaline Phosphatase


  87 U/L


()


 


Troponin I Quantitative


  < 0.017 ng/mL


(0.000-0.055)


 


NT-Pro-B-Type Natriuretic


Peptide 337 pg/mL


(0-124)  H


 


Total Protein


  7.3 g/dL


(6.4-8.2)


 


Albumin


  3.1 g/dL


(3.4-5.0)  L


 


Lipase


  102 U/L


()





 Laboratory Tests


17 20:55








 Laboratory Tests


17 20:55














EKG


EKG


EC: Sinus rhythm, heart rate 99 bpm, incomplete right bundle-branch 

block noted, QTc of 460, , QRS of 86, patient with contour normality is 

noted in the inferior leads, very mild ST depressions noted in lead 2, also in 

V6, no significant elevations noted, abnormal ECG, does not meet STEMI criteria

, with compared to ECG from 1/15/2016, axis is now normalized, mild depression 

is new, but contour normality is in the anterior septal leads are consistent. 

Does not meet STEMI criteria. Pain is a 9 out of 10.





EC50: Sinus rhythm, heart rate 88 bpm, upright axis, QTC of 471, AL of 

182, QRS of 86, no significant changes identified, as interpreted by me. Pain 

is a 9 of 10.





ECGs reviewed with  of cardiology.





Radiology/Procedures


Radiology/Procedures


Chest x-ray: One view: Straightening left heart border, otherwise normal 

cardiac silhouette, normal pulmonary silhouette, no infiltrates, effusions, 

soft tissue or bony abnormalities identified. As interpreted by me. []





Course & Med Decision Making


Course & Med Decision Making


Pertinent Labs and Imaging studies reviewed. (See chart for details)





Patient's history and presentation concerning for acute coronary syndrome. 

Initial troponin was negative and the emergency department, ECG abnormal, but 

does not meet STEMI criteria stated, was reviewed with Dr. Morales of 

cardiology. At this time will administer a single dose of Lovenox to the patient

, continue symptomatic management with morphine, as patient has declined 

nitroglycerin due to reports of previous adverse effects. Patient, as stated, 

had self-administered full dose aspirin prior to arrival in the department 

arrival. Repeat ECG did not reveal any new or acute acutely concerning 

findings. Findings as above were discussed with Dr. Page of internal medicine

, patient accepted to his service as a full admission to the cardiac telemetry 

floor with cardiology evaluation, to rule out ACS. Patient transferred to the 

cardiac telemetry floor without issue. Bridge orders entered per discussion.





Dragon Disclaimer


Dragon Disclaimer


This electronic medical record was generated, in whole or in part, using a 

voice recognition dictation system.





Departure


Impression:  


 Primary Impression:  


 Chest pain


Disposition:   ADMITTED AS INPATIENT


Admitting Physician:  Merlyn Page


Condition:  STABLE











HUNTERMEME ALVARADO DO May 28, 2017 00:09

## 2017-06-14 ENCOUNTER — HOSPITAL ENCOUNTER (EMERGENCY)
Dept: HOSPITAL 35 - ER | Age: 62
Discharge: HOME | End: 2017-06-14
Payer: COMMERCIAL

## 2017-06-14 VITALS — HEIGHT: 70 IN | WEIGHT: 182.01 LBS | BODY MASS INDEX: 26.06 KG/M2

## 2017-06-14 DIAGNOSIS — F17.200: ICD-10-CM

## 2017-06-14 DIAGNOSIS — I25.10: ICD-10-CM

## 2017-06-14 DIAGNOSIS — Z88.4: ICD-10-CM

## 2017-06-14 DIAGNOSIS — E78.00: ICD-10-CM

## 2017-06-14 DIAGNOSIS — Z98.890: ICD-10-CM

## 2017-06-14 DIAGNOSIS — I10: ICD-10-CM

## 2017-06-14 DIAGNOSIS — Z91.041: ICD-10-CM

## 2017-06-14 DIAGNOSIS — R55: ICD-10-CM

## 2017-06-14 DIAGNOSIS — Z88.6: ICD-10-CM

## 2017-06-14 DIAGNOSIS — Z95.5: ICD-10-CM

## 2017-06-14 DIAGNOSIS — Z88.0: ICD-10-CM

## 2017-06-14 DIAGNOSIS — J44.9: ICD-10-CM

## 2017-06-14 DIAGNOSIS — E11.9: ICD-10-CM

## 2017-06-14 DIAGNOSIS — Z79.4: ICD-10-CM

## 2017-06-14 DIAGNOSIS — Z88.5: ICD-10-CM

## 2017-06-14 DIAGNOSIS — R07.89: Primary | ICD-10-CM

## 2017-06-14 DIAGNOSIS — Z91.040: ICD-10-CM

## 2017-06-14 DIAGNOSIS — Z91.048: ICD-10-CM

## 2017-06-14 LAB
ALBUMIN SERPL-MCNC: 3.3 G/DL (ref 3.4–5)
ALP SERPL-CCNC: 84 U/L (ref 46–116)
ALT SERPL-CCNC: 17 U/L (ref 30–65)
ANION GAP SERPL CALC-SCNC: 11 MMOL/L (ref 7–16)
APTT BLD: 24.8 SECONDS (ref 24.5–32.8)
AST SERPL-CCNC: 14 U/L (ref 15–37)
BASOPHILS NFR BLD AUTO: 1.1 % (ref 0–2)
BILIRUB SERPL-MCNC: 0.2 MG/DL
BUN SERPL-MCNC: 5 MG/DL (ref 7–18)
CALCIUM SERPL-MCNC: 9.1 MG/DL (ref 8.5–10.1)
CHLORIDE SERPL-SCNC: 106 MMOL/L (ref 98–107)
CK-MB MASS: 4 NG/ML
CO2 SERPL-SCNC: 26 MMOL/L (ref 21–32)
CREAT SERPL-MCNC: 1 MG/DL (ref 0.7–1.3)
EOSINOPHIL NFR BLD: 9.9 % (ref 0–3)
ERYTHROCYTE [DISTWIDTH] IN BLOOD BY AUTOMATED COUNT: 16.4 % (ref 10.5–14.5)
GLUCOSE SERPL-MCNC: 140 MG/DL (ref 74–106)
GRANULOCYTES NFR BLD MANUAL: 43.9 % (ref 36–66)
HCT VFR BLD CALC: 39.5 % (ref 42–52)
HGB BLD-MCNC: 13 GM/DL (ref 14–18)
INR PPP: 1.1
LYMPHOCYTES NFR BLD AUTO: 39.3 % (ref 24–44)
MAGNESIUM SERPL-MCNC: 1.4 MG/DL (ref 1.8–2.4)
MANUAL DIFFERENTIAL PERFORMED BLD QL: NO
MCH RBC QN AUTO: 26.6 PG (ref 26–34)
MCHC RBC AUTO-ENTMCNC: 32.9 G/DL (ref 28–37)
MCV RBC: 80.8 FL (ref 80–100)
MONOCYTES NFR BLD: 5.8 % (ref 1–8)
NEUTROPHILS # BLD: 3.1 THOU/UL (ref 1.4–8.2)
NT-PRO BRAIN NAT PEPTIDE: 308 PG/ML (ref ?–300)
PLATELET # BLD: 307 THOU/UL (ref 150–400)
POTASSIUM SERPL-SCNC: 3.9 MMOL/L (ref 3.5–5.1)
PROT SERPL-MCNC: 6.7 G/DL (ref 6.4–8.2)
PROTHROMBIN TIME: 10.9 SECONDS (ref 9.3–11.4)
RBC # BLD AUTO: 4.9 MIL/UL (ref 4.5–6)
SODIUM SERPL-SCNC: 143 MMOL/L (ref 136–145)
TROPONIN I SERPL-MCNC: < 0.04 NG/ML
WBC # BLD AUTO: 7.1 THOU/UL (ref 4–11)

## 2017-06-22 ENCOUNTER — HOSPITAL ENCOUNTER (INPATIENT)
Dept: HOSPITAL 61 - ER | Age: 62
LOS: 1 days | Discharge: LEFT BEFORE DISCHARGE | DRG: 313 | End: 2017-06-23
Attending: INTERNAL MEDICINE | Admitting: INTERNAL MEDICINE
Payer: MEDICAID

## 2017-06-22 VITALS — BODY MASS INDEX: 25.64 KG/M2 | WEIGHT: 179.12 LBS | HEIGHT: 70 IN

## 2017-06-22 DIAGNOSIS — Z79.899: ICD-10-CM

## 2017-06-22 DIAGNOSIS — Z95.5: ICD-10-CM

## 2017-06-22 DIAGNOSIS — I25.2: ICD-10-CM

## 2017-06-22 DIAGNOSIS — I11.0: ICD-10-CM

## 2017-06-22 DIAGNOSIS — Z91.041: ICD-10-CM

## 2017-06-22 DIAGNOSIS — Z88.5: ICD-10-CM

## 2017-06-22 DIAGNOSIS — Z88.8: ICD-10-CM

## 2017-06-22 DIAGNOSIS — E11.40: ICD-10-CM

## 2017-06-22 DIAGNOSIS — Z82.49: ICD-10-CM

## 2017-06-22 DIAGNOSIS — I50.9: ICD-10-CM

## 2017-06-22 DIAGNOSIS — Z86.73: ICD-10-CM

## 2017-06-22 DIAGNOSIS — Z88.0: ICD-10-CM

## 2017-06-22 DIAGNOSIS — Z88.6: ICD-10-CM

## 2017-06-22 DIAGNOSIS — E78.5: ICD-10-CM

## 2017-06-22 DIAGNOSIS — R07.9: Primary | ICD-10-CM

## 2017-06-22 DIAGNOSIS — Z79.1: ICD-10-CM

## 2017-06-22 DIAGNOSIS — I25.10: ICD-10-CM

## 2017-06-22 DIAGNOSIS — Z79.4: ICD-10-CM

## 2017-06-22 DIAGNOSIS — Z79.891: ICD-10-CM

## 2017-06-22 LAB
ALBUMIN SERPL-MCNC: 3.5 G/DL (ref 3.4–5)
ALP SERPL-CCNC: 91 U/L (ref 46–116)
ALT SERPL-CCNC: 21 U/L (ref 16–63)
ANION GAP SERPL CALC-SCNC: 10 MMOL/L (ref 6–14)
AST SERPL-CCNC: 15 U/L (ref 15–37)
BASOPHILS # BLD AUTO: 0 X10^3/UL (ref 0–0.2)
BASOPHILS NFR BLD: 0 % (ref 0–3)
BILIRUB DIRECT SERPL-MCNC: < 0.1 MG/DL (ref 0–0.2)
BILIRUB SERPL-MCNC: 0.2 MG/DL (ref 0.2–1)
BUN SERPL-MCNC: 10 MG/DL (ref 8–26)
CALCIUM SERPL-MCNC: 9 MG/DL (ref 8.5–10.1)
CHLORIDE SERPL-SCNC: 102 MMOL/L (ref 98–107)
CK SERPL-CCNC: 93 U/L (ref 39–308)
CKMB MASS: 2.9 NG/ML (ref 0–3.6)
CO2 SERPL-SCNC: 28 MMOL/L (ref 21–32)
CREAT SERPL-MCNC: 0.8 MG/DL (ref 0.7–1.3)
EOSINOPHIL NFR BLD: 9 % (ref 0–3)
ERYTHROCYTE [DISTWIDTH] IN BLOOD BY AUTOMATED COUNT: 16.4 % (ref 11.5–14.5)
GFR SERPLBLD BASED ON 1.73 SQ M-ARVRAT: 98.3 ML/MIN
GLUCOSE SERPL-MCNC: 189 MG/DL (ref 70–99)
HCT VFR BLD CALC: 37.4 % (ref 39–53)
HGB BLD-MCNC: 12.5 G/DL (ref 13–17.5)
INR PPP: 1 (ref 0.8–1.1)
LYMPHOCYTES # BLD: 3.2 X10^3/UL (ref 1–4.8)
LYMPHOCYTES NFR BLD AUTO: 38 % (ref 24–48)
MAGNESIUM SERPL-MCNC: 1.6 MG/DL (ref 1.8–2.4)
MCH RBC QN AUTO: 27 PG (ref 25–35)
MCHC RBC AUTO-ENTMCNC: 33 G/DL (ref 31–37)
MCV RBC AUTO: 80 FL (ref 79–100)
MONOCYTES NFR BLD: 6 % (ref 0–9)
NEUTROPHILS NFR BLD AUTO: 47 % (ref 31–73)
PLATELET # BLD AUTO: 368 X10^3/UL (ref 140–400)
POTASSIUM SERPL-SCNC: 4.1 MMOL/L (ref 3.5–5.1)
PROT SERPL-MCNC: 7.3 G/DL (ref 6.4–8.2)
PROTHROMBIN TIME: 12.7 SEC (ref 11.7–14)
RBC # BLD AUTO: 4.71 X10^6/UL (ref 4.3–5.7)
SODIUM SERPL-SCNC: 140 MMOL/L (ref 136–145)
WBC # BLD AUTO: 8.3 X10^3/UL (ref 4–11)

## 2017-06-22 PROCEDURE — 96372 THER/PROPH/DIAG INJ SC/IM: CPT

## 2017-06-22 PROCEDURE — 71010: CPT

## 2017-06-22 PROCEDURE — 82553 CREATINE MB FRACTION: CPT

## 2017-06-22 PROCEDURE — 83880 ASSAY OF NATRIURETIC PEPTIDE: CPT

## 2017-06-22 PROCEDURE — 96365 THER/PROPH/DIAG IV INF INIT: CPT

## 2017-06-22 PROCEDURE — 93005 ELECTROCARDIOGRAM TRACING: CPT

## 2017-06-22 PROCEDURE — 96376 TX/PRO/DX INJ SAME DRUG ADON: CPT

## 2017-06-22 PROCEDURE — 96375 TX/PRO/DX INJ NEW DRUG ADDON: CPT

## 2017-06-22 PROCEDURE — 85027 COMPLETE CBC AUTOMATED: CPT

## 2017-06-22 PROCEDURE — 36415 COLL VENOUS BLD VENIPUNCTURE: CPT

## 2017-06-22 PROCEDURE — 83735 ASSAY OF MAGNESIUM: CPT

## 2017-06-22 PROCEDURE — 80048 BASIC METABOLIC PNL TOTAL CA: CPT

## 2017-06-22 PROCEDURE — 80076 HEPATIC FUNCTION PANEL: CPT

## 2017-06-22 PROCEDURE — 84484 ASSAY OF TROPONIN QUANT: CPT

## 2017-06-22 PROCEDURE — 85610 PROTHROMBIN TIME: CPT

## 2017-06-22 RX ADMIN — MORPHINE SULFATE PRN MG: 2 INJECTION, SOLUTION INTRAMUSCULAR; INTRAVENOUS at 22:45

## 2017-06-22 RX ADMIN — MORPHINE SULFATE PRN MG: 2 INJECTION, SOLUTION INTRAMUSCULAR; INTRAVENOUS at 21:33

## 2017-06-22 RX ADMIN — MORPHINE SULFATE PRN MG: 2 INJECTION, SOLUTION INTRAMUSCULAR; INTRAVENOUS at 22:16

## 2017-06-22 RX ADMIN — MORPHINE SULFATE PRN MG: 2 INJECTION, SOLUTION INTRAMUSCULAR; INTRAVENOUS at 21:03

## 2017-06-22 NOTE — PHYS DOC
Past Medical History


Past Medical History:  CHF, CVA, Diabetes-Type II, Hypertension, MI


Past Surgical History:  Angioplasty


Additional Past Surgical Histo:  Left ankle, exploritory abd, cardiac stent


Additional Information:  


PIPE TOBACCO


Alcohol Use:  None


Drug Use:  None





Adult General


Chief Complaint


Chief Complaint:  CHEST PAIN





HPI


HPI





Patient is a 61  year old male who presents with the complaint of abdominal 

pain since about 7:30 PM. Pain is on the left side of the patient's chest and 

he believes it feels like his heart. It's a pressure type feeling. He took 4 

chewable aspirin at home. The pain has not improved. He does not have 

nitroglycerin at home because "it makes me unresponsive".





Patient states he has had an MI and has had a stent. His last catheter was done 

in [place]. Patient's cardiologist is Dr. Carrillo at [facility]. He used to live 

on the [place] side but recently moved to this area.





PCP Dr. Munoz





Review of Systems


Review of Systems





Constitutional: Denies fever or chills []


Eyes: Denies change in visual acuity, redness, or eye pain []


HENT: Denies nasal congestion or sore throat []


Respiratory: Denies cough or shortness of breath []


Cardiovascular: As in history of present illness


GI: Denies abdominal pain, nausea, vomiting, bloody stools or diarrhea []


: Denies dysuria or hematuria []


Musculoskeletal: Denies back pain or joint pain []


Integument: Denies rash or skin lesions []


Neurologic: Denies headache, focal weakness or sensory changes []





Current Medications


Current Medications





Current Medications








 Medications


  (Trade)  Dose


 Ordered  Sig/Yennifer  Start Time


 Stop Time Status Last Admin


Dose Admin


 


 Acetaminophen


  (Tylenol)  650 mg  PRN Q6HRS  PRN  6/22/17 22:00


     


 


 


 Al Hydroxide/Mg


 Hydroxide


  (Mylanta Plus Xs)  30 ml  PRN Q3HRS  PRN  6/22/17 22:00


     


 


 


 Aspirin


  (Ecotrin)  81 mg  DAILY  6/23/17 09:00


   UNV  


 


 


 Bisacodyl


  (Dulcolax Supp)  10 mg  PRN DAILY  PRN  6/22/17 22:00


     


 


 


 Calcium Carbonate/


 Glycine


  (Tums)  500 mg  PRN Q3HRS  PRN  6/22/17 22:00


     


 


 


 Clopidogrel


 Bisulfate


  (Plavix)  75 mg  DAILY  6/23/17 09:00


     


 


 


 Dextrose


  (Dextrose


 50%-Water Syringe)  12.5 gm  PRN Q15MIN  PRN  6/22/17 22:00


     


 


 


 Docusate Sodium


  (Colace)  100 mg  BID  6/23/17 09:00


   UNV  


 


 


 Insulin Aspart


  (NovoLOG)  0-9 UNITS  TIDWMEALS  6/23/17 08:00


   UNV  


 


 


 Insulin Detemir


  (Levemir)  45 units  HS  6/22/17 22:45


    6/22/17 22:49


45 UNITS


 


 Lactulose  20 gm  PRN Q12HR  PRN  6/22/17 22:00


     


 


 


 Lisinopril


  (Prinivil)  5 mg  DAILY  6/23/17 09:00


   UNV  


 


 


 Magnesium


 Hydroxide


  (Milk Of


 Magnesia)  2,400 mg  PRN Q12HR  PRN  6/22/17 22:00


     


 


 


 Magnesium Sulfate/


 Dextrose  50 ml @ 25


 mls/hr  1X  ONCE  6/22/17 22:00


 6/22/17 23:59  6/22/17 22:21


25 MLS/HR


 


 Metformin HCl


  (Glucophage)  1,000 mg  BIDWMEALS  6/23/17 08:00


   UNV  


 


 


 Metoprolol


 Tartrate


  (Lopressor)  25 mg  BID  6/22/17 22:15


     


 


 


 Morphine Sulfate  4 mg  1X  ONCE  6/22/17 23:15


 6/22/17 23:16 UNV  


 


 


 Non-Formulary


 Medication  10 unit  TIDAC  6/23/17 07:30


   UNV  


 


 


 Ondansetron HCl


  (Zofran)  4 mg  PRN Q6HRS  PRN  6/22/17 22:00


     


 


 


 Oxycodone/


 Acetaminophen


  (Percocet 5/325)  1 tab  PRN Q4HRS  PRN  6/22/17 22:00


     


 


 


 Prochlorperazine


  (Compazine)  25 mg  PRN Q12HR  PRN  6/22/17 22:00


     


 


 


 Prochlorperazine


 Edisylate


  (Compazine)  10 mg  PRN Q6HRS  PRN  6/22/17 22:00


     


 


 


 Sertraline HCl


  (Zoloft)  25 mg  DAILY  6/23/17 09:00


   UNV  


 


 


 Tamsulosin HCl


  (Flomax)  0.4 mg  QHS  6/22/17 22:15


    6/22/17 22:16


0.4 MG











Allergies


Allergies





Allergies








Coded Allergies Type Severity Reaction Last Updated Verified


 


  Iodinated Contrast- Oral and IV Dye Allergy Severe "Breathing issues" 12/31/

15 No


 


  nitroglycerin Allergy Severe "Pretty much unresponsive right away" 1/15/16 Yes


 


  Penicillins Allergy Intermediate Hives  12/31/15 Yes


 


  codeine Allergy Intermediate  5/27/17 Yes


 


  fentanyl Allergy Intermediate rash 12/31/15 Yes


 


  ketorolac Allergy Intermediate  5/27/17 Yes


 


  methylprednisolone Allergy Intermediate  5/27/17 Yes











Physical Exam


Physical Exam





Constitutional: Well developed, well nourished, alert, mentating normally, no 

acute distress, warm and dry


HENT: Normocephalic, atraumatic, bilateral external ears normal,  nose normal. [

]


Eyes:  conjunctiva normal, no discharge. [] 


Neck: Normal range of motion, no stridor. [] 


Cardiovascular:Heart rate regular rhythm, no murmur []


Lungs & Thorax:  Bilateral breath sounds clear to auscultation []


Abdomen: Bowel sounds normal, soft, no tenderness, no masses, no pulsatile 

masses. [] 


Skin: Warm, dry, no erythema, no rash. [] 


Extremities: No tenderness, no cyanosis, no clubbing, ROM intact, no edema. [] 


Neurologic: Alert and oriented X 3, normal motor function, normal sensory 

function, no focal deficits noted. []





Current Patient Data


Vital Signs





 Vital Signs








  Date Time  Temp Pulse Resp B/P (MAP) Pulse Ox O2 Delivery O2 Flow Rate FiO2


 


6/22/17 20:39 98.2 86 24 137/70 (92) 96 Room Air  





 98.2       








Lab Values





 Laboratory Tests








Test


  6/22/17


20:50 6/22/17


20:55


 


White Blood Count


  8.3 x10^3/uL


(4.0-11.0) 


 


 


Red Blood Count


  4.71 x10^6/uL


(4.30-5.70) 


 


 


Hemoglobin


  12.5 g/dL


(13.0-17.5)  L 


 


 


Hematocrit


  37.4 %


(39.0-53.0)  L 


 


 


Mean Corpuscular Volume


  80 fL ()


  


 


 


Mean Corpuscular Hemoglobin 27 pg (25-35)   


 


Mean Corpuscular Hemoglobin


Concent 33 g/dL


(31-37) 


 


 


Red Cell Distribution Width


  16.4 %


(11.5-14.5)  H 


 


 


Platelet Count


  368 x10^3/uL


(140-400) 


 


 


Neutrophils (%) (Auto) 47 % (31-73)   


 


Lymphocytes (%) (Auto) 38 % (24-48)   


 


Monocytes (%) (Auto) 6 % (0-9)   


 


Eosinophils (%) (Auto) 9 % (0-3)  H 


 


Basophils (%) (Auto) 0 % (0-3)   


 


Neutrophils # (Auto)


  3.9 x10^3uL


(1.8-7.7) 


 


 


Lymphocytes # (Auto)


  3.2 x10^3/uL


(1.0-4.8) 


 


 


Monocytes # (Auto)


  0.5 x10^3/uL


(0.0-1.1) 


 


 


Eosinophils # (Auto)


  0.7 x10^3/uL


(0.0-0.7) 


 


 


Basophils # (Auto)


  0.0 x10^3/uL


(0.0-0.2) 


 


 


Prothrombin Time


  12.7 SEC


(11.7-14.0) 


 


 


Prothrombin Time INR 1.0 (0.8-1.1)   


 


Sodium Level


  140 mmol/L


(136-145) 


 


 


Potassium Level


  4.1 mmol/L


(3.5-5.1) 


 


 


Chloride Level


  102 mmol/L


() 


 


 


Carbon Dioxide Level


  28 mmol/L


(21-32) 


 


 


Anion Gap 10 (6-14)   


 


Blood Urea Nitrogen


  10 mg/dL


(8-26) 


 


 


Creatinine


  0.8 mg/dL


(0.7-1.3) 


 


 


Estimated GFR


(Cockcroft-Gault) 98.3  


  


 


 


Glucose Level


  189 mg/dL


(70-99)  H 


 


 


Calcium Level


  9.0 mg/dL


(8.5-10.1) 


 


 


Magnesium Level


  1.6 mg/dL


(1.8-2.4)  L 


 


 


Total Bilirubin


  0.2 mg/dL


(0.2-1.0) 


 


 


Direct Bilirubin


  < 0.1 mg/dL


(0.0-0.2) 


 


 


Aspartate Amino Transferase


(AST) 15 U/L (15-37)


  


 


 


Alanine Aminotransferase (ALT)


  21 U/L (16-63)


  


 


 


Alkaline Phosphatase


  91 U/L


() 


 


 


Creatine Kinase


  93 U/L


() 


 


 


Creatine Kinase MB (Mass)


  2.9 ng/mL


(0.0-3.6) 


 


 


Creatine Kinase MB Relative


Index 3.1 % (0-4)  


  


 


 


Troponin I Quantitative


  < 0.017 ng/mL


(0.000-0.055) 


 


 


NT-Pro-B-Type Natriuretic


Peptide 419 pg/mL


(0-124)  H 


 


 


Total Protein


  7.3 g/dL


(6.4-8.2) 


 


 


Albumin


  3.5 g/dL


(3.4-5.0) 


 


 


POC Troponin I


  


  0.01 ng/ml


(<0.08)





 Laboratory Tests


6/22/17 20:50








 Laboratory Tests


6/22/17 20:50














EKG


EKG


12-lead EKG read by me. Sinus rhythm. Heart rate 83. There is lateral T-wave 

inversion in V6. There are no acute ST elevations or depressions. No STEMI. 

Possible lateral ischemia.  2028





Repeat 12-lead EKG done for continued chest pain. Sinus rhythm. Heart rate 80. 

The previous T-wave inversion in V6 is now gone, T waves are upright in V6. 

There are no ST elevations or depressions. No STEMI. 2218[]





Radiology/Procedures


Radiology/Procedures


One view portable chest x-ray read by me. No acute cardiopulmonary abnormality. 

[]





Course & Med Decision Making


Course & Med Decision Making


Pertinent Labs and Imaging studies reviewed. (See chart for details)





61-year-old male with a cardiac history presents with that episode of left-

sided chest pain. The patient is adamant that he cannot take nitroglycerin "it 

made me go unresponsive". He states that his blood pressure did not drop but he 

became unresponsive and had to be moved to the ICU. Initial EKG concerning for 

lateral ischemia but no STEMI. I-STAT troponin was done and was negative. 

Patient's pain was treated with IV morphine.





Labs unremarkable for acute findings. Patient appeared comfortable in the ED, 

vitals within normal limits, no diaphoresis, color good, but did continue to 

complain of left-sided chest pain. He was given several doses of IV morphine 

with some improvement. He ambulated to the bathroom without difficulty.





At this point, I don't believe the patient is having a STEMI, we have done 2 

EKGs, and initial troponin is negative. However, he does have a significant 

cardiac history and he needs to be ruled out and may need further evaluation.





I discussed the case with Dr. Vasquez on call for Dr. Munoz. She will admit the 

patient to his service. I wrote bridge orders. The patient is stable and 

initial troponin is negative.





[]





Dragon Disclaimer


Dragon Disclaimer


This electronic medical record was generated, in whole or in part, using a 

voice recognition dictation system.





Departure


Departure


Impression:  


 Primary Impression:  


 Chest pain


Disposition:  09 ADMITTED AS INPATIENT


Admitting Physician:  Kerri Daniel


Condition:  STABLE


Referrals:  


UNKNOWN PCP NAME (PCP)











NATALIIA MACIAS MD Jun 22, 2017 21:54

## 2017-06-23 VITALS — DIASTOLIC BLOOD PRESSURE: 61 MMHG | SYSTOLIC BLOOD PRESSURE: 122 MMHG

## 2017-06-23 VITALS — SYSTOLIC BLOOD PRESSURE: 93 MMHG | DIASTOLIC BLOOD PRESSURE: 54 MMHG

## 2017-06-23 VITALS — DIASTOLIC BLOOD PRESSURE: 59 MMHG | SYSTOLIC BLOOD PRESSURE: 125 MMHG

## 2017-06-23 RX ADMIN — MORPHINE SULFATE PRN MG: 2 INJECTION, SOLUTION INTRAMUSCULAR; INTRAVENOUS at 04:32

## 2017-06-23 RX ADMIN — MORPHINE SULFATE PRN MG: 2 INJECTION, SOLUTION INTRAMUSCULAR; INTRAVENOUS at 07:26

## 2017-06-23 RX ADMIN — MORPHINE SULFATE PRN MG: 2 INJECTION, SOLUTION INTRAMUSCULAR; INTRAVENOUS at 02:01

## 2017-06-23 NOTE — PDOC
Provider Note


Provider Note


Pt now wanting to leave AMA - before Dr. Thomas could see today - no dc summ 

needed


Rahul Rn KENIA Phillips MD Jun 23, 2017 08:10

## 2017-06-23 NOTE — PDOC1
History and Physical


Date of Admission


Date of Admission


DATE: 6/23/17 


TIME: 08:01





Identification/Chief Complaint


Chief Complaint


CP - 





LATE ENTRY,. PT WAS SEEN LAST NIGHT AT ER, then PT TOLD US HIS PCP WS DR. JIMÉNEZ. ORDERS PUT IN,. ONLY TO REALIZE THIS AM THAT HE LAST SAW PCP MAYBE A YR 

OR 2 AGO. PT WILL NOW BE UNDER HOSPITALIST. DISCUSSED WITH DR. CHAN


Problems:  





Source


Source:  Caregiver, Chart review, Patient





History of Present Illness


History of Present Illness


61 y./o  male, hx CAD with PCI 2008, comes in with CP at rest, 

radiation to left arm and left scapula back area, Claims similar to CP he had 

when he had the MI. Looks still uncomfortable in ER. Trops neg first set but 

EKG some T wave inversions V5 and V6,. VS so far ok. COnsult put out to cards, 

NO identifiable precipitating or alleviating factors. SOme soa, no diaphoresis. 

REst of hx limited as pt still uncomfortable. Claims compliance with home meds





Past Medical History


Cardiovascular:  CAD, HTN, Hyperlipidemia


Musculoskeletal:   low back pain


Endocrine:  Diabetes





Past Surgical History


Past Surgical History:  Other





Family History


Family History:  Hypertension





Social History


Smoke:  No


ALCOHOL:  none


Drugs:  None





Current Medications


Current Medications





Current Medications


Morphine Sulfate 2 mg PRN Q15MIN  PRN IV/SQ PAIN GREATER THAN 3/10 Last 

administered on 6/22/17at 22:45;  Start 6/22/17 at 20:45;  Stop 6/23/17 at 20:44


Ondansetron HCl (Zofran) 4 mg 1X  ONCE IV  Last administered on 6/22/17at 21:03

;  Start 6/22/17 at 20:45;  Stop 6/22/17 at 20:46;  Status DC


Magnesium Sulfate/ Dextrose 50 ml @ 25 mls/hr 1X  ONCE IV  Last administered on 

6/22/17at 22:21;  Start 6/22/17 at 22:00;  Stop 6/22/17 at 23:59;  Status DC


Ondansetron HCl (Zofran) 4 mg PRN Q6HRS  PRN IV NAUSEA/VOMITING;  Start 6/22/17 

at 22:00;  Status Cancel


Prochlorperazine Edisylate (Compazine) 10 mg PRN Q6HRS  PRN IV NAUSEA/VOMITING;

  Start 6/22/17 at 22:00;  Status Cancel


Prochlorperazine (Compazine) 25 mg PRN Q12HR  PRN RI NAUSEA/VOMITING;  Start 6/ 22/17 at 22:00;  Status Cancel


Al Hydroxide/Mg Hydroxide (Mylanta Plus Xs) 30 ml PRN Q3HRS  PRN PO HEARTBURN / 

GAS;  Start 6/22/17 at 22:00;  Status Cancel


Calcium Carbonate/ Glycine (Tums) 500 mg PRN Q3HRS  PRN PO UPSET STOMACH;  

Start 6/22/17 at 22:00;  Status Cancel


Morphine Sulfate 2 mg PRN Q2HR  PRN IV PAIN;  Start 6/22/17 at 22:00;  Status 

Cancel


Oxycodone/ Acetaminophen (Percocet 5/325) 1 tab PRN Q4HRS  PRN PO MILD PAIN, 

2ND CHOICE;  Start 6/22/17 at 22:00;  Status Cancel


Acetaminophen (Tylenol) 650 mg PRN Q6HRS  PRN PO Headaches, Temp > 101.5F;  

Start 6/22/17 at 22:00;  Status Cancel


Docusate Sodium (Colace) 100 mg BID PO ;  Start 6/23/17 at 09:00;  Status Cancel


Magnesium Hydroxide (Milk Of Magnesia) 2,400 mg PRN Q12HR  PRN PO CONSTIPATION;

  Start 6/22/17 at 22:00;  Status Cancel


Lactulose 20 gm PRN Q12HR  PRN PO CONSTIPATION;  Start 6/22/17 at 22:00;  

Status Cancel


Bisacodyl (Dulcolax Supp) 10 mg PRN DAILY  PRN RI CONSTIPATION;  Start 6/22/17 

at 22:00;  Status Cancel


Aspirin (Ecotrin) 81 mg DAILY PO ;  Start 6/23/17 at 09:00;  Status Cancel


Clopidogrel Bisulfate (Plavix) 75 mg DAILY PO ;  Start 6/23/17 at 09:00;  

Status Cancel


Lisinopril (Prinivil) 5 mg DAILY PO ;  Start 6/23/17 at 09:00;  Status Cancel


Metformin HCl (Glucophage) 1,000 mg BIDWMEALS PO ;  Start 6/23/17 at 08:00;  

Status Cancel


Metoprolol Tartrate (Lopressor) 25 mg BID PO ;  Start 6/22/17 at 22:15;  Stop 6/ 23/17 at 00:52;  Status DC


Sertraline HCl (Zoloft) 25 mg DAILY PO ;  Start 6/23/17 at 09:00;  Status Cancel


Tamsulosin HCl (Flomax) 0.4 mg QHS PO  Last administered on 6/22/17at 22:16;  

Start 6/22/17 at 22:15;  Stop 6/23/17 at 00:52;  Status DC


Non-Formulary Medication 2 puff BID IH ;  Start 6/23/17 at 09:00;  Status UNV


Gabapentin (Neurontin) 300 mg Q6HRS PO ;  Start 6/23/17 at 00:00;  Stop 6/23/17 

at 00:52;  Status DC


Insulin Detemir (Levemir) 45 units HS SQ  Last administered on 6/22/17at 22:49;

  Start 6/22/17 at 22:45;  Stop 6/23/17 at 00:52;  Status DC


Non-Formulary Medication 10 unit TIDAC SQ ;  Start 6/23/17 at 07:30;  Status UNV


Insulin Aspart (NovoLOG) 0-9 UNITS TIDWMEALS SQ ;  Start 6/23/17 at 08:00;  

Status Cancel


Dextrose (Dextrose 50%-Water Syringe) 12.5 gm PRN Q15MIN  PRN IV SEE COMMENTS;  

Start 6/22/17 at 22:00;  Status Cancel


Morphine Sulfate 4 mg 1X  ONCE IV  Last administered on 6/22/17at 23:40;  Start 

6/22/17 at 23:45;  Stop 6/22/17 at 23:46;  Status DC


Morphine Sulfate 10 mg STK-MED ONCE .ROUTE ;  Start 6/22/17 at 23:35;  Stop 6/22 /17 at 23:36;  Status Cancel


Budesonide (Pulmicort) 0.5 mg RTBID NEB ;  Start 6/23/17 at 08:00;  Status 

Cancel


Albuterol Sulfate (Ventolin Neb Soln) 2.5 mg RTQID NEB ;  Start 6/23/17 at 08:00

;  Status Cancel


Morphine Sulfate 2 mg PRN Q2HR  PRN IV PAIN Last administered on 6/23/17at 07:26

;  Start 6/23/17 at 01:30


Ondansetron HCl (Zofran) 4 mg PRN Q6HRS  PRN IV NAUSEA/VOMITING;  Start 6/23/17 

at 01:30





Active Scripts


Active


Reported


Lisinopril 10 Mg Tablet 1 Tab PO DAILY


Aspir 81 (Aspirin) 81 Mg Tablet.dr 1 Tab PO DAILY


Zoloft (Sertraline Hcl) 25 Mg Tablet 25 Mg PO DAILY


Clopidogrel (Clopidogrel Bisulfate) 75 Mg Tablet 1 Tab PO DAILY


Lantus (Insulin Glargine,Hum.rec.anlog) 100 Unit/1 Ml Vial 45 Unit SQ HS


Metoprolol Tartrate 25 Mg Tablet 25 Mg PO BID


Gabapentin 300 Mg Capsule 300 Mg PO Q6HRS


Flomax (Tamsulosin Hcl) 0.4 Mg Cap.er.24h 0.4 Mg PO QHS


Metformin Hcl 1,000 Mg Tablet 1,000 Mg PO BIDWMEALS





Allergies


Allergies:  


Coded Allergies:  


     Iodinated Contrast- Oral and IV Dye (Verified  Allergy, Severe, "Breathing 

issues", 6/23/17)


     nitroglycerin (Verified  Allergy, Severe, "Pretty much unresponsive right 

away", 1/15/16)


 Low blood pressure


     Penicillins (Verified  Allergy, Intermediate, Hives , 12/31/15)


     codeine (Verified  Allergy, Intermediate, 5/27/17)


     fentanyl (Verified  Allergy, Intermediate, rash, 12/31/15)


     ketorolac (Verified  Allergy, Intermediate, 5/27/17)


     methylprednisolone (Verified  Allergy, Intermediate, 5/27/17)





ROS


General:  No: Chills, Night Sweats, Fatigue, Malaise, Appetite, Other


PSYCHOLOGICAL ROS:  No: Anxiety, Behavioral Disorder, Concentration difficultie

, Decreased libido, Depression, Disorientation, Hallucinations, Hostility, 

Irritablity, Memory difficulties, Mood Swings, Obsessive thoughts, Physical 

abuse, Sexual abuse, Sleep disturbances, Suicidal ideation, Other


Eyes:  No Blurry vision, No Decreased vision, No Double vision, No Dry eyes, No 

Excessive tearing, No Eye Pain, No Itchy Eyes, No Loss of vision, No Photophobia

, No Scotomata, No Uses contacts, No Uses glasses, No Other


HEENT:  No: Heacaches, Visual Changes, Hearing change, Nasal congestion, Nasal 

discharge, Oral lesions, Sinus pain, Sore Throat, Epistaxis, Sneezing, Snoring, 

Tinnitus, Vertigo, Vocal changes, Other


ALLERGY AND IMMUNOLOGY:  No: Hives, Insect Bite Sensitivity, Itchy/Watery Eyes, 

Nasal Congestion, Post Nasal Drip, Seasonal Allergies, Other


Hematological and Lymphatic:  No: Bleeding Problems, Blood Clots, Blood 

Transfusions, Brusing, Night Sweats, Pallor, Swollen Lymph Nodes, Other


ENDOCRINE:  No: Breast Changes, Galactorrhea, Hair Pattern Changes, Hot Flashes

, Malaise/lethargy, Mood Swings, Palpitations, Polydipsia/polyuria, Skin Changes

, Temperature Intolerance, Unexpected Weight Changes, Other


Breast:  No New/Changing Breast Lumps, No Nipple changes, No Nipple discharge, 

No Other


Respiratory:  YES: Shortness of breath, 


   No: Cough, Hemoptysis, Orthopnea, Pleuritic Pain, SOB with excertion, Sputum 

Changes, Stridor, Tachypnea, Wheezing, Other


Cardiovascular:  yes Chest Pain


Gastrointestinal:  No Nausea, No Vomiting, No Abdominal Pain, No Diarrhea, No 

Constipation, No Melena, No Hematochezia, No Other


Genitourinary:  No Dysuria, No Frequency, No Incontinence, No Hematuria, No 

Retention, No Discharge, No Urgency, No Pain, No Flank Pain, No Other, No , No 

, No , No , No , No , No 


Musculoskeletal:  No Gait Disturbance, No Joint Pain, No Joint Stiffness, No 

Joint Swelling, No Muscle Pain, No Muscular Weakness, No Pain In:, No Swelling 

In:, No Other


Neurological:  No Behavorial Changes, No Bowel/Bladder ControlChng, No Confusion

, No Dizziness, No Gait Disturbance, No Headaches, No Impaired Coord/balance, 

No Memory Loss, No Numbness/Tingling, No Seizures, No Speech Problems, No 

Tremors, No Visual Changes, No Weakness, No Other


Skin:  No Dry Skin, No Eczema, No Hair Changes, No Lumps, No Mole Changes, No 

Mottling, No Nail Changes, No Pruritus, No Rash, No Skin Lesion Changes, No 

Other, No Acne





Physical Exam


General:  Alert, Oriented X3, Cooperative, No acute distress, Other (moderately 

uncomfortable from on going chest pain)


HEENT:  Atraumatic, PERRLA, EOMI


Lungs:  Clear to auscultation, Normal air movement


Heart:  S1S2, RRR, no thrills, no rubs, no gallops


Cardiovascular:  S1, S2


Breasts:  Normal, Rt breast nml w/o mass, Lt breast nml w/o mass, Nipples normal


Abdomen:  Normal bowel sounds, Soft, No tenderness, No hepatosplenomegaly, No 

masses


Male Genitals Exam:  normal genitalia, normal prostate


Rectal Exam:  not examined


Extremities:  No clubbing, No cyanosis, No edema, Normal pulses, No tenderness/

swelling


Skin:  No rashes, No breakdown, No significant lesion


Neuro:  Normal gait, Normal speech, Strength at 5/5 X4 ext, Normal tone, 

Sensation intact, Cranial nerves 3-12 NL, Reflexes 2+


Psych/Mental Status:  Mental status NL, Mood NL





Vitals


Vitals





Vital Signs








  Date Time  Temp Pulse Resp B/P (MAP) Pulse Ox O2 Delivery O2 Flow Rate FiO2


 


6/23/17 07:26      Room Air  


 


6/23/17 04:32   18  92  2.0 


 


6/23/17 03:00 97.5 88  125/59 (81)    





 97.5       











Labs


Labs





Laboratory Tests








Test


  6/22/17


20:50 6/22/17


20:55 6/23/17


06:55


 


White Blood Count


  8.3 x10^3/uL


(4.0-11.0) 


  


 


 


Red Blood Count


  4.71 x10^6/uL


(4.30-5.70) 


  


 


 


Hemoglobin


  12.5 g/dL


(13.0-17.5) 


  


 


 


Hematocrit


  37.4 %


(39.0-53.0) 


  


 


 


Mean Corpuscular Volume 80 fL ()   


 


Mean Corpuscular Hemoglobin 27 pg (25-35)   


 


Mean Corpuscular Hemoglobin


Concent 33 g/dL


(31-37) 


  


 


 


Red Cell Distribution Width


  16.4 %


(11.5-14.5) 


  


 


 


Platelet Count


  368 x10^3/uL


(140-400) 


  


 


 


Neutrophils (%) (Auto) 47 % (31-73)   


 


Lymphocytes (%) (Auto) 38 % (24-48)   


 


Monocytes (%) (Auto) 6 % (0-9)   


 


Eosinophils (%) (Auto) 9 % (0-3)   


 


Basophils (%) (Auto) 0 % (0-3)   


 


Neutrophils # (Auto)


  3.9 x10^3uL


(1.8-7.7) 


  


 


 


Lymphocytes # (Auto)


  3.2 x10^3/uL


(1.0-4.8) 


  


 


 


Monocytes # (Auto)


  0.5 x10^3/uL


(0.0-1.1) 


  


 


 


Eosinophils # (Auto)


  0.7 x10^3/uL


(0.0-0.7) 


  


 


 


Basophils # (Auto)


  0.0 x10^3/uL


(0.0-0.2) 


  


 


 


Prothrombin Time


  12.7 SEC


(11.7-14.0) 


  


 


 


Prothromb Time International


Ratio 1.0 (0.8-1.1) 


  


  


 


 


Sodium Level


  140 mmol/L


(136-145) 


  


 


 


Potassium Level


  4.1 mmol/L


(3.5-5.1) 


  


 


 


Chloride Level


  102 mmol/L


() 


  


 


 


Carbon Dioxide Level


  28 mmol/L


(21-32) 


  


 


 


Anion Gap 10 (6-14)   


 


Blood Urea Nitrogen


  10 mg/dL


(8-26) 


  


 


 


Creatinine


  0.8 mg/dL


(0.7-1.3) 


  


 


 


Estimated GFR


(Cockcroft-Gault) 98.3 


  


  


 


 


Glucose Level


  189 mg/dL


(70-99) 


  


 


 


Calcium Level


  9.0 mg/dL


(8.5-10.1) 


  


 


 


Magnesium Level


  1.6 mg/dL


(1.8-2.4) 


  


 


 


Total Bilirubin


  0.2 mg/dL


(0.2-1.0) 


  


 


 


Direct Bilirubin


  < 0.1 mg/dL


(0.0-0.2) 


  


 


 


Aspartate Amino Transf


(AST/SGOT) 15 U/L (15-37) 


  


  


 


 


Alanine Aminotransferase


(ALT/SGPT) 21 U/L (16-63) 


  


  


 


 


Alkaline Phosphatase


  91 U/L


() 


  


 


 


Creatine Kinase


  93 U/L


() 


  


 


 


Creatine Kinase MB (Mass)


  2.9 ng/mL


(0.0-3.6) 


  


 


 


Creatine Kinase MB Relative


Index 3.1 % (0-4) 


  


  


 


 


Troponin I Quantitative


  < 0.017 ng/mL


(0.000-0.055) 


  < 0.017 ng/mL


(0.000-0.055)


 


NT-Pro-B-Type Natriuretic


Peptide 419 pg/mL


(0-124) 


  


 


 


Total Protein


  7.3 g/dL


(6.4-8.2) 


  


 


 


Albumin


  3.5 g/dL


(3.4-5.0) 


  


 


 


Bedside Troponin I


  


  0.01 ng/ml


(<0.08) 


 








Laboratory Tests








Test


  6/22/17


20:50 6/22/17


20:55 6/23/17


06:55


 


White Blood Count


  8.3 x10^3/uL


(4.0-11.0) 


  


 


 


Red Blood Count


  4.71 x10^6/uL


(4.30-5.70) 


  


 


 


Hemoglobin


  12.5 g/dL


(13.0-17.5) 


  


 


 


Hematocrit


  37.4 %


(39.0-53.0) 


  


 


 


Mean Corpuscular Volume 80 fL ()   


 


Mean Corpuscular Hemoglobin 27 pg (25-35)   


 


Mean Corpuscular Hemoglobin


Concent 33 g/dL


(31-37) 


  


 


 


Red Cell Distribution Width


  16.4 %


(11.5-14.5) 


  


 


 


Platelet Count


  368 x10^3/uL


(140-400) 


  


 


 


Neutrophils (%) (Auto) 47 % (31-73)   


 


Lymphocytes (%) (Auto) 38 % (24-48)   


 


Monocytes (%) (Auto) 6 % (0-9)   


 


Eosinophils (%) (Auto) 9 % (0-3)   


 


Basophils (%) (Auto) 0 % (0-3)   


 


Neutrophils # (Auto)


  3.9 x10^3uL


(1.8-7.7) 


  


 


 


Lymphocytes # (Auto)


  3.2 x10^3/uL


(1.0-4.8) 


  


 


 


Monocytes # (Auto)


  0.5 x10^3/uL


(0.0-1.1) 


  


 


 


Eosinophils # (Auto)


  0.7 x10^3/uL


(0.0-0.7) 


  


 


 


Basophils # (Auto)


  0.0 x10^3/uL


(0.0-0.2) 


  


 


 


Prothrombin Time


  12.7 SEC


(11.7-14.0) 


  


 


 


Prothromb Time International


Ratio 1.0 (0.8-1.1) 


  


  


 


 


Sodium Level


  140 mmol/L


(136-145) 


  


 


 


Potassium Level


  4.1 mmol/L


(3.5-5.1) 


  


 


 


Chloride Level


  102 mmol/L


() 


  


 


 


Carbon Dioxide Level


  28 mmol/L


(21-32) 


  


 


 


Anion Gap 10 (6-14)   


 


Blood Urea Nitrogen


  10 mg/dL


(8-26) 


  


 


 


Creatinine


  0.8 mg/dL


(0.7-1.3) 


  


 


 


Estimated GFR


(Cockcroft-Gault) 98.3 


  


  


 


 


Glucose Level


  189 mg/dL


(70-99) 


  


 


 


Calcium Level


  9.0 mg/dL


(8.5-10.1) 


  


 


 


Magnesium Level


  1.6 mg/dL


(1.8-2.4) 


  


 


 


Total Bilirubin


  0.2 mg/dL


(0.2-1.0) 


  


 


 


Direct Bilirubin


  < 0.1 mg/dL


(0.0-0.2) 


  


 


 


Aspartate Amino Transf


(AST/SGOT) 15 U/L (15-37) 


  


  


 


 


Alanine Aminotransferase


(ALT/SGPT) 21 U/L (16-63) 


  


  


 


 


Alkaline Phosphatase


  91 U/L


() 


  


 


 


Creatine Kinase


  93 U/L


() 


  


 


 


Creatine Kinase MB (Mass)


  2.9 ng/mL


(0.0-3.6) 


  


 


 


Creatine Kinase MB Relative


Index 3.1 % (0-4) 


  


  


 


 


Troponin I Quantitative


  < 0.017 ng/mL


(0.000-0.055) 


  < 0.017 ng/mL


(0.000-0.055)


 


NT-Pro-B-Type Natriuretic


Peptide 419 pg/mL


(0-124) 


  


 


 


Total Protein


  7.3 g/dL


(6.4-8.2) 


  


 


 


Albumin


  3.5 g/dL


(3.4-5.0) 


  


 


 


Bedside Troponin I


  


  0.01 ng/ml


(<0.08) 


 











VTE Prophylaxis Ordered


VTE Prophylaxis Devices:  Yes


VTE Pharmacological Prophylaxi:  Yes





Assessment/Plan


Assessment/Plan


1. CHest pain, concerning for unstable angina, hx CAD with PCI x 1 2008


2. 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


3. HTN


4. ALlergy to NTG





PLAn:


Admit CVC


Cards consult


Cycle CE


Stool softener


Resume home meds


Check [id number]


SSI


Morphine now, PARUL


O2 


If no resolve CP with morphine, need to call cards- claims anaphylaxis to NTG SL





Seen last night at ER


Dw KENIA Yi MD Jun 23, 2017 08:08

## 2017-06-23 NOTE — A[facility]
Admission Forms Criteria


                            CARDIOLOGY [facility]





Clinical Indications for Admission to Inpatient Care


    


                                                                               (

Place 'X' for any and all applicable criteria): 





Hospital admission is needed for appropriate care of the patient because of ANY 

ONE of the following (1): 





[ ] I.    Hemodynamic instability as indicated by ALL of the following (1)(2)(3)

(4)(5)


         [ ]a)   Vital signs or other findings not as expected for chronic 

patient condition or baseline


         [ ]b)   Instability indicated by ANY ONE of the following:


                  [ ]i)     Hypotension


                  [ ]ii)    Symptomatic Tachycardia unresponsive to treatment (

e.g., analgesia, fluids, sedation as indicated)


                  [ ]iii)   Inadequate perfusion indicated by ANY ONE of the 

following:


                            [ ] 1)   Lactic acidosis (> 2 mmol/L)


                            [ ] 2)   New abnormal capillary refill (> 3 seconds)


                            [ ] 3)   Reduced urine output


                            [ ] 4)   New altered mental status


                  [ ]iv)   Orthostatic vital sign changes unresponsive to 

treatment (e.g., fluids)              


                  [ ]v)    IV inotropic or vasopressor medication required to 

maintain adequate blood pressure or perfusion


[ ] II.  Severe heart failure as indicated by ANY ONE of the following(17)(18)


         [ ]a)  Respiratory distress        


         [ ]b)  Hypotension


         [ ]c)  Anasarca (refractory to outpatient therapy) 


         [ ]d)  Cardiac arrhythmias of immediate concern 


         [ ]e)  Myocardial ischemia


[ ] III. Cardiac arrhythmias or findings of immediate concern indicated by ANY 

ONE of the following (19)(20):


         [ ] a)  Heart rhythms that are inherently dangerous or unstable 

indicated by ANY ONE of the following (21)(22)(23):


                 [ ] i)    Resuscitated ventricular fibrillation or cardiac 

arrest


                 [ ] ii)   Ventricular escape rhythm


                 [ ] iii)  Sustained ventricular tachycardia (30 seconds or 

more of ventricular rhythm at greater than 100 beats per minute)


                 [ ] iv)  Nonsustained ventricular tachycardia and ANY ONE of 

the following:


                          [ ] 1)    Suspected cardiac ischemia as cause or 

consequence of ventricular tachycardia    


                          [ ] 2)    In setting of acute myocarditis         


         [ ] b)  Unstable cardiac conduction defects indicated by ANY ONE of 

the following(23)(24)(25)


                  [ ] i)    Type II second-degree atrioventricular block    


                  [ ]ii)    Third-degree atrioventricular block                


                  [ ]iii)    New-onset left bundle branch block with suspected 

myocardial ischemia


         [ ]c)   Any heart rhythm and ANY ONE of the following (21)(22)(26)(27) 

(28)


                  [ ] i)    Continuous long-term ECG monitoring needed (e.g., 

initiation of drug requiring monitoring for more than 24 hours)


                  [ ] ii)   Patient has automatic implanted cardioverter 

defibrillator that is repeatedly firing, malfunctioning, or in need of 

immediate 


                            adjustment of settings beyond the scope of 

ambulatory or observation care


        [ ]d)    Heart rhythms of concern due to ANY ONE of the following:


                  [ ] i)    Hypotension


                  [ ] ii)    Respiratory distress


                  [ ] iii)   Association with other significant symptoms (e.g., 

bradycardia with syncope or ongoing dizziness, supraventricular 


                            tachycardia with chest pain (14)(15)(17)


[ ] IV.   Monitoring for cardiac contusion beyond the scope of observation care 

needed [A](30)(31)(32)


[ ] V.    Surgical or device complication (e.g., valve replacement complication

, pacemaker dysfunction) (35)(41)(44)(45)(46)


[ ] VI.   Inpatient palliative care needed. [B](49) Also use Inpatient 

Palliative Care Criteria


[ ] VII.  Nonbacterial thrombotic (marantic) endocarditis (36)(43)(47)(48)


[X] VIII. Cardiology condition, symptom, or finding for which emergency and 

observation care has failed or are not considered appropriate.


[ ] IX.   Acute valvular disease requiring inpatient as indicated by ANY ONE of 

the following (41)


          [ ]a)   Acute valvular regurgitation (42)


          [ ]b)   Noninfectious valvulitis (43)


          [ ]c)   Obstructive valve thrombosis 


          [ ]d)   Paravalvular leak


          [ ]e)   Other significant valvular disorder remaining after emergency 

or observation level of care (as appropriate)


[ ]X.    Pericardial disease requiring inpatient treatment as indicated by ANY 

ONE of the following (33)(34)(35)(36)(37)           


          [ ]a)   Suspected tamponade (38)(39)(40)


          [ ]b)   Hemopericardium


          [ ]c)   Other significant pericardial disorder remaining after 

emergency or observation level of care (as appropriate)


[ ] XI.  Cardiac ischemia beyond scope of emergency and observation care. 


[ ] XII. Hypertension requiring inpatient treatment as indicated by ANY ONE of 

the following (6)(7)(8)


         [ ]a)    SBP greater than 220 mm Hg or DBP greater than 120 mmHg 

despite treatment


         [ ]b)    SBP greater than 140 mm Hg or DBP greater than 100 mm Hg with 

evidence of acute end organ damage as indicated


                   by ANY ONE of the following


                   [ ] i)      Encephalopathy


                   [ ] ii)     Acute renal failure as indicated by new onset of 

ANY ONE of the following (9)(10)(11)(12)(13)


                               [ ]1)  3-fold rise in serum creatinine from 

baseline


                               [ ]2)  Serum creatinine greater than 4 mg/dL (

354 micromoles/L) with acute rise greater than 0.5 mg/dL (44.2 micromoles/L)


                               [ ]3)  Reduction of more than 75% in estimated 

glomerular filtration rate from baseline 


                               [ ]4)  Estimated glomerular filtration rate less 

than 35 mL/min/1.73m2 (0.59 mL/sec/1.73m2) in child up to 18 years of age


                               [ ]5)  Cessation of urine output indicated by ALL

 of the following


                                       [ ]A.   Adequate volume status


                                       [ ]B.   Inadequate urine output as 

indicated by ANY ONE of the following       


                                                [ ]a.   Urine output less than 

0.3 mL/kg/hr for 24 hours


                                                [ ]b.   Anuria (urine output 

less than 0.1 mL/kg/hr) for 12 hours 


                  [ ] iii)      Aortic dissection


                  [ ] iv)      Myocardial Ischemia


                  [ ] v)       Left ventricular heart failure 


                  [ ]vi)       Retinal Hemorrhage


                  [ ]vii)      Other significant finding


         [ ]c)   Hypertension in child requiring inpatient treatment as 

indicated by ALL of the following(14)(15)(16)


                  [ ] i)        Outpatient treatment not effective, not 

available, or not appropriate               


                  [ ]ii)        SBP or DBP greater than 95th percentile for age


                  [ ]iii)       Evidence of acute end organ damage as indicated 

by ANY ONE of the following 


                               [ ]1)     Altered mental status


                               [ ]2)    Acute renal failure as indicated by new 

onset of ANY ONE of the following(9)(10)(11)(12)(13)


                                         [ ]A.    3-fold rise in serum 

creatinine from baseline


                                         [ ]B.    Serum creatinine greater than 

4 mg/dL (354 micromoles/L) with acute rise greater than 0.5 mg/dL (44.2 

micromoles/L)


                                         [ ]C.    Reduction of more than 75% in 

estimated glomerular filtration rate from baseline


                                         [ ]D.    Estimated glomerular 

filtration rate less than 35 mL/min/1.73m2 (0.59 mL/sec/1.73m2) in child up to 

18 years of age 


                                         [ ]E.    Cessation of urine output 

indicated by ALL of the following 


                                                   [ ]a.  Adequate volume status


                                                   [ ]b.  Inadequate urine 

output as indicated by ANY ONE of the following 


                                                           [ ]i)    Urine 

output less than 0.3 mL/kg/hr for 24 hours


                                                           [ ]ii)   Anuria (

urine output less than 0.1 mL/kg/hr) for 12 hours                              

  


                                                    [ ]3)  Severe headache


                              [ ]4)  Visual disturbance 


                              [ ]5)  Retinal hemorrhage


                              [ ]6)  Other significant finding


[ ]XIII.   Complications of transplanted heart indicated by ANY ONE of the 

following(61):


           [ ]a)  Acute graft rejection requiring inpatient management (eg, 

intravenous immunosuppression)(62)(63)


           [ ]b)  Acute graft heart failure indicated by ANY ONE of the 

following(64):


                   [ ]i)   Hemodynamic instability


                   [ ]ii)  Cardiac arrhythmias of immediate concern


                   [ ]iii) Pulmonary edema that is very severe (eg, mechanical 

ventilation needed,


                          imminent or likely, need for 100% oxygen to keep 

oxygen saturation above 90%)


                   [ ]iv) Pulmonary edema that is persistent as indicated by ALL

 of the following:


                          [ ]1)   New need for oxygen therapy to keep oxygen 

saturation above 90% (or increased FiO2 need from baseline)


                          [ ]2)   Has not improved sufficiently with emergency 

department or observation


                                   care IV diuretics or other heart failure 

treatments[E]


                   [ ]v)   Altered mental status that is severe or persistent


                   [ ]vi)   Increased creatinine (new on laboratory test) with 

reduction of more than 50% in


                            estimated glomerular filtration rate from baseline


                   [ ]vii)  Progressively (ongoing) rising creatinine (known 

from past laboratory test) with


                            reduction of more than 25% in estimated glomerular 

filtration rate from baseline


                   [ ]viii) Acute renal failure


                   [ ]ix)  Acute peripheral ischemia (eg, examination shows 

pulseless, cool, mottled, or cyanotic extremity)


                   [ ]x)   Pulmonary artery catheter monitoring needed


                   [ ]xi)  Other sign or symptom of heart failure requiring 

inpatient treatment (ie, too severe or


                            not responsive to outpatient and observation care 

treatment)


        [ ]c)    Infection requiring inpatient management (eg, Hemodynamic 

instability, need for intravenous antimicrobial treatment)(66)(67)(68)(69)(70)


        [ ]d)   Cardiac allograft vasculopathy requiring inpatient management (

eg evidence of cardiac ischemia)(71)


        [ ]e)   Other complication of transplanted heart (eg, stroke, severe 

pulmonary hypertension, severe valvular 


                 dysfunction) requiring inpatient management(72)








The original [facility] content created by [facility] has been revised. 


The portions of the content which have been revised are identified through the 

use of italic text or in bold, and [facility] 


has neither reviewed nor approved the modified material. All other unmodified 

content is copyright  [facility].





Please see references footnoted in the original [facility] edition 

2016


Admission Criteria Met?:  Yes











LAQUITA DESIR Jun 23, 2017 00:15

## 2017-06-23 NOTE — EKG
[facility]

              [address] [place] [address]

Test Date:    2017               Test Time:    22:18:43

Pat Name:     BRYANNA CLAIRE              Department:   

Patient ID:   [id number]           Room:          

Gender:       M                        Technician:   

:          1955               Requested By: NATALIIA MACIAS

Order Number: [id number]            Reading MD:     

                                 Measurements

Intervals                              Axis          

Rate:         80                       P:            59

NJ:           182                      QRS:          74

QRSD:         88                       T:            92

QT:           394                                    

QTc:          458                                    

                           Interpretive Statements

SINUS RHYTHM

R-S TRANSITION ZONE IN V LEADS DISPLACED TO THE LEFT

S1,S2,S3 PATTERN

QRS(T) CONTOUR ABNORMALITY

CANNOT RULE OUT ANTEROSEPTAL MYOCARDIAL DAMAGE

RI6.01          Unconfirmed report

No previous ECG available for comparison

## 2017-06-23 NOTE — EKG
[facility]

              [address] [place] [address]

Test Date:    2017               Test Time:    20:28:00

Pat Name:     BRYANNA CLAIRE              Department:   

Patient ID:   [id number]           Room:          

Gender:       M                        Technician:   

:          1955               Requested By: NATALIIA MACIAS

Order Number: [id number]            Reading MD:     

                                 Measurements

Intervals                              Axis          

Rate:         83                       P:            90

AZ:           170                      QRS:          71

QRSD:         92                       T:            124

QT:           394                                    

QTc:          469                                    

                           Interpretive Statements

SINUS RHYTHM

S1,S2,S3 PATTERN

QRS(T) CONTOUR ABNORMALITY

CONSIDER ANTEROSEPTAL MYOCARDIAL DAMAGE

T ABNORMALITY IN LATERAL LEADS

RI6.01          Unconfirmed report

No previous ECG available for comparison

## 2017-06-23 NOTE — RAD
Portable chest, 6/22/2017:



History: Chest pain, shortness of breath



Comparison is made to a study from 5/27/2017. The heart size and pulmonary

vascularity are normal. There is calcific plaquing of the aorta. There is

minimal parenchymal scarring. No acute infiltrate is seen. There is no

evidence of pleural fluid.



IMPRESSION: No acute cardiopulmonary abnormality is detected.

## 2017-06-24 ENCOUNTER — HOSPITAL ENCOUNTER (EMERGENCY)
Dept: HOSPITAL 63 - ER | Age: 62
Discharge: NURSING, REHAB OR LONG-TERM CARE | End: 2017-06-24
Payer: MEDICAID

## 2017-06-24 VITALS — DIASTOLIC BLOOD PRESSURE: 59 MMHG | SYSTOLIC BLOOD PRESSURE: 134 MMHG

## 2017-06-24 VITALS — HEIGHT: 70 IN | WEIGHT: 178 LBS | BODY MASS INDEX: 25.48 KG/M2

## 2017-06-24 DIAGNOSIS — I11.0: ICD-10-CM

## 2017-06-24 DIAGNOSIS — F17.290: ICD-10-CM

## 2017-06-24 DIAGNOSIS — Z88.4: ICD-10-CM

## 2017-06-24 DIAGNOSIS — Z88.0: ICD-10-CM

## 2017-06-24 DIAGNOSIS — Z88.8: ICD-10-CM

## 2017-06-24 DIAGNOSIS — Z88.6: ICD-10-CM

## 2017-06-24 DIAGNOSIS — E78.00: ICD-10-CM

## 2017-06-24 DIAGNOSIS — Z98.61: ICD-10-CM

## 2017-06-24 DIAGNOSIS — I25.110: Primary | ICD-10-CM

## 2017-06-24 DIAGNOSIS — I50.9: ICD-10-CM

## 2017-06-24 LAB
ALBUMIN SERPL-MCNC: 3.4 G/DL (ref 3.4–5)
ALBUMIN/GLOB SERPL: 0.9 {RATIO} (ref 1–1.7)
ALP SERPL-CCNC: 92 U/L (ref 46–116)
ALT SERPL-CCNC: 18 U/L (ref 16–63)
ANION GAP SERPL CALC-SCNC: 9 MMOL/L (ref 6–14)
AST SERPL-CCNC: 16 U/L (ref 15–37)
BASOPHILS # BLD AUTO: 0.2 X10^3/UL (ref 0–0.2)
BASOPHILS NFR BLD: 2 % (ref 0–3)
BILIRUB SERPL-MCNC: 0.2 MG/DL (ref 0.2–1)
BUN/CREAT SERPL: 9 (ref 6–20)
CA-I SERPL ISE-MCNC: 11 MG/DL (ref 8–26)
CALCIUM SERPL-MCNC: 8.9 MG/DL (ref 8.5–10.1)
CHLORIDE SERPL-SCNC: 102 MMOL/L (ref 98–107)
CK SERPL-CCNC: 103 U/L (ref 39–308)
CO2 SERPL-SCNC: 25 MMOL/L (ref 21–32)
CREAT SERPL-MCNC: 1.2 MG/DL (ref 0.7–1.3)
EOSINOPHIL NFR BLD: 0.9 X10^3/UL (ref 0–0.7)
EOSINOPHIL NFR BLD: 11 % (ref 0–3)
ERYTHROCYTE [DISTWIDTH] IN BLOOD BY AUTOMATED COUNT: 16.4 % (ref 11.5–14.5)
GFR SERPLBLD BASED ON 1.73 SQ M-ARVRAT: 61.6 ML/MIN
GLOBULIN SER-MCNC: 3.7 G/DL (ref 2.2–3.8)
GLUCOSE SERPL-MCNC: 177 MG/DL (ref 70–99)
HCT VFR BLD CALC: 39.1 % (ref 39–53)
HGB BLD-MCNC: 12.8 G/DL (ref 13–17.5)
LIPASE: 93 U/L (ref 73–393)
LYMPHOCYTES # BLD: 3 X10^3/UL (ref 1–4.8)
LYMPHOCYTES NFR BLD AUTO: 36 % (ref 24–48)
MAGNESIUM SERPL-MCNC: 1.6 MG/DL (ref 1.8–2.4)
MCH RBC QN AUTO: 27 PG (ref 25–35)
MCHC RBC AUTO-ENTMCNC: 33 G/DL (ref 31–37)
MCV RBC AUTO: 81 FL (ref 79–100)
MONO #: 0.5 X10^3/UL (ref 0–1.1)
MONOCYTES NFR BLD: 6 % (ref 0–9)
NEUT #: 3.7 X10^3UL (ref 1.8–7.7)
NEUTROPHILS NFR BLD AUTO: 45 % (ref 31–73)
PLATELET # BLD AUTO: 345 X10^3/UL (ref 140–400)
POTASSIUM SERPL-SCNC: 4.3 MMOL/L (ref 3.5–5.1)
PROT SERPL-MCNC: 7.1 G/DL (ref 6.4–8.2)
RBC # BLD AUTO: 4.83 X10^6/UL (ref 4.3–5.7)
SODIUM SERPL-SCNC: 136 MMOL/L (ref 136–145)
WBC # BLD AUTO: 8.4 X10^3/UL (ref 4–11)

## 2017-06-24 PROCEDURE — 96375 TX/PRO/DX INJ NEW DRUG ADDON: CPT

## 2017-06-24 PROCEDURE — 80053 COMPREHEN METABOLIC PANEL: CPT

## 2017-06-24 PROCEDURE — 36415 COLL VENOUS BLD VENIPUNCTURE: CPT

## 2017-06-24 PROCEDURE — 83880 ASSAY OF NATRIURETIC PEPTIDE: CPT

## 2017-06-24 PROCEDURE — 82553 CREATINE MB FRACTION: CPT

## 2017-06-24 PROCEDURE — 85027 COMPLETE CBC AUTOMATED: CPT

## 2017-06-24 PROCEDURE — 84443 ASSAY THYROID STIM HORMONE: CPT

## 2017-06-24 PROCEDURE — 99291 CRITICAL CARE FIRST HOUR: CPT

## 2017-06-24 PROCEDURE — 96374 THER/PROPH/DIAG INJ IV PUSH: CPT

## 2017-06-24 PROCEDURE — 83735 ASSAY OF MAGNESIUM: CPT

## 2017-06-24 PROCEDURE — 96361 HYDRATE IV INFUSION ADD-ON: CPT

## 2017-06-24 PROCEDURE — 84484 ASSAY OF TROPONIN QUANT: CPT

## 2017-06-24 PROCEDURE — 93005 ELECTROCARDIOGRAM TRACING: CPT

## 2017-06-24 PROCEDURE — 83690 ASSAY OF LIPASE: CPT

## 2017-06-24 PROCEDURE — 71010: CPT
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
## 2017-06-24 NOTE — DS
DATE OF DISCHARGE:  06/23/2017



CHIEF COMPLAINT:  Chest pain.



HOSPITAL COURSE:  The patient was admitted for chest pain, but left AMA prior to

being seen by physician.



DISCHARGE DATE: 06/23/2017



DISCHARGE DIAGNOSIS:  Chest pain.



DISCHARGE DISPOSITION:  AMA.

 



______________________________

JOAQUÍN MORALES MD



DR:  MADISON/tisha  JOB#:  [id number] / [id number]

DD:  06/23/2017 16:51  DT:  06/24/2017 00:02

SUNSHINE

## 2017-06-24 NOTE — ACF
Admission Criteria Forms


                                           TELEMETRY CARE





Telemetry Admission Guidelines





                                                             (Place 'X' for any 

and all applicable criteria):





Admission to telemetry [A] may be indicated for ANY ONE of the following(1)(2)(3

)(4)(5):


[X]I.    Cardiac disease, including ANY ONE  of the following (9)(10)(11)(12)(13

):   


         [ ]a)   Postacute MI


         [ ]b)   Low-risk patients with ST-segment elevation  MI who have 

undergone successful percutaneous coronary intervention


         [X]c)   Unstable angina             


         [ ]d)   Suspected MI (until it is ruled out)


         [ ]e)   Post cardiac  surgery (first 48 to 72 hours unless 

complications occur)


         [ ]f)    Acute arrhythmias (including significant tachycardia or 

bradycardia) [B]


         [ ]g)   Firing of an implantable cardioverter defibrillator [C]


         [ ]h)   Suspected pacemaker or implantable cardioverter defibrillator 

malfunction (10)


         [ ]i)    New administration or adjustment of an antiarrhythmic drug [D

] 


         [ ]j)    Child admitted for acute congestive heart failure            

  


         [ ]j)    Long QT syndrome 


         [ ]k)   Advanced heart block (eg, second-degree Mobitz  type II, third-

degree heart block)


         [ ]l)    Acute myocarditis or pericarditis


         [ ]m)  Short-term (ambulatory or inpatient) monitoring after a cardiac

  procedure as indicated  by ANY ONE of  the following [E]:


                  [ ]i)     Electrophysiologic studies                          


                  [ ]ii)    Percutaneous coronary  intervention with stent 

placement


                  [ ]iii)   Pacemaker placement with cardiac  conduction defect 


                  [ ]iv)   Implantable cardiac  defibrillator placement


[ ]II.   Drug overdose  or poisoning with substance that causes arrhythmias or 

QT prolongation (eg, phenothiazines, sympathomimetic agents, 


         cyclic antidepressants, digitalis, antiarrhythmic drugs)(15)


[ ]III.  Short-term (ambulatory or inpatient) monitoring after therapeutic or 

diagnostic procedure requiring 


         conscious sedation or anesthesia (eg, endoscopy, elective   

cardioversion)


[ ]IV.  Acute cerebrovascular even[F](18)


[ ]V.   Massive blood transfusion (eg, at least 10 units of packed red blood 

cells in 24 hours)


[ ]VI.  Variceal bleeding after endoscopy, sclerotherapy, or IV vasopressin


[ ]VII. Uncorrected electrolyte  abnormalities associated with an increased  

risk of dangerous arrhythmia [G]; examples include


         [ ]a)   Hyperkalemia with attributable ECG changes


         [ ]b)   Potassium greater  than 6.5 mmol/L  (mEq/L) in a patient  

without  history of chronic  renal disease 


         [ ]c)   Prolonged QT attributed to hypokalemia,   hypomagnesemia, or 

hypocalcemia


[ ]VIII.Unexplained syncope  or other neurologic event suspected of being due 

to arrhythmia due to a finding that increases risk; 


        examples include(19)(20)(21):


        [ ]a)   High-risk ECG findings (eg, bifascicular block, bradycardia, 

abnormal QT interval,  ventricular pre- excitation)


        [ ]b)   History of previous  syncope  due to arrhythmia


        [ ]c)   Abnormal ventricular function  (eg, reduced  ejection  fraction

)      


        [ ]d)   Exertional or supine syncope


        [ ]e)   Concerning syncope  characteristics (eg, sudden loss of 

consciousness without  prodrome)


        [ ]f)    Family history of sudden  death


        [ ]g)   Use of arrhythmogenic medication          


        [ ]h)   Suspected cardiac  ischemia


        [ ]i)    Known channelopathy (eg, long QT syndrome, Brugada syndrome, 

or catecholaminergic paroxysmal ventricular tachycardia)


        [ ]j)    Known structural heart disease (eg, hypertrophic cardiomyopathy

, severe valvular disease)


        [ ]k)   Palpitations preceding syncope











The original [employer] content created by [employer] has been revised. 


The portions of  the content which have been revised are identified through the 

use of italic text or in bold, and [facility] 


has neither reviewed nor approved the modified material. All other unmodified 

content is copyright  [employer].





Please see references footnoted in the original [employer] edition 

2016


Admission Criteria Met?:  Pending











MAGNOLIA HOFFMANN Jun 24, 2017 21:59

## 2017-06-25 NOTE — RAD
AP PORTABLE CHEST



Clinical Indication: chest pain.



Comparison:  None.



Findings: 

The cardiomediastinal silhouette is normal. 



Minimal scarring in the lung bases. Lungs are clear. 



There is no pneumothorax. No pleural effusion is appreciated. 

There is no acute bone abnormality. 



IMPRESSION: 

No acute cardiopulmonary process.

## 2017-06-30 ENCOUNTER — HOSPITAL ENCOUNTER (OUTPATIENT)
Dept: HOSPITAL 35 - ER | Age: 62
Setting detail: OBSERVATION
LOS: 1 days | Discharge: LEFT BEFORE DISCHARGE | End: 2017-07-01
Attending: INTERNAL MEDICINE | Admitting: INTERNAL MEDICINE
Payer: COMMERCIAL

## 2017-06-30 VITALS — HEIGHT: 65.98 IN | BODY MASS INDEX: 30.53 KG/M2 | WEIGHT: 190 LBS

## 2017-06-30 VITALS — DIASTOLIC BLOOD PRESSURE: 63 MMHG | SYSTOLIC BLOOD PRESSURE: 93 MMHG

## 2017-06-30 VITALS — SYSTOLIC BLOOD PRESSURE: 133 MMHG | DIASTOLIC BLOOD PRESSURE: 66 MMHG

## 2017-06-30 DIAGNOSIS — R07.89: Primary | ICD-10-CM

## 2017-06-30 DIAGNOSIS — E11.9: ICD-10-CM

## 2017-06-30 DIAGNOSIS — Z95.5: ICD-10-CM

## 2017-06-30 DIAGNOSIS — F17.210: ICD-10-CM

## 2017-06-30 DIAGNOSIS — I10: ICD-10-CM

## 2017-06-30 DIAGNOSIS — E78.5: ICD-10-CM

## 2017-06-30 DIAGNOSIS — I25.2: ICD-10-CM

## 2017-06-30 DIAGNOSIS — J44.9: ICD-10-CM

## 2017-06-30 LAB
ALBUMIN SERPL-MCNC: 3.1 G/DL (ref 3.4–5)
ALP SERPL-CCNC: 89 U/L (ref 46–116)
ALT SERPL-CCNC: 17 U/L (ref 30–65)
ANION GAP SERPL CALC-SCNC: 10 MMOL/L (ref 7–16)
AST SERPL-CCNC: 14 U/L (ref 15–37)
BASOPHILS NFR BLD AUTO: 0.8 % (ref 0–2)
BILIRUB SERPL-MCNC: 0.2 MG/DL
BUN SERPL-MCNC: 9 MG/DL (ref 7–18)
CALCIUM SERPL-MCNC: 9.1 MG/DL (ref 8.5–10.1)
CHLORIDE SERPL-SCNC: 104 MMOL/L (ref 98–107)
CO2 SERPL-SCNC: 21 MMOL/L (ref 21–32)
CREAT SERPL-MCNC: 0.9 MG/DL (ref 0.7–1.3)
EOSINOPHIL NFR BLD: 7.6 % (ref 0–3)
ERYTHROCYTE [DISTWIDTH] IN BLOOD BY AUTOMATED COUNT: 16.2 % (ref 10.5–14.5)
GLUCOSE SERPL-MCNC: 231 MG/DL (ref 74–106)
GRANULOCYTES NFR BLD MANUAL: 49.8 % (ref 36–66)
HCT VFR BLD CALC: 38.6 % (ref 42–52)
HGB BLD-MCNC: 12.8 GM/DL (ref 14–18)
LYMPHOCYTES NFR BLD AUTO: 36.5 % (ref 24–44)
MAGNESIUM SERPL-MCNC: 1.3 MG/DL (ref 1.8–2.4)
MANUAL DIFFERENTIAL PERFORMED BLD QL: NO
MCH RBC QN AUTO: 26.4 PG (ref 26–34)
MCHC RBC AUTO-ENTMCNC: 33.1 G/DL (ref 28–37)
MCV RBC: 79.7 FL (ref 80–100)
MONOCYTES NFR BLD: 5.3 % (ref 1–8)
NEUTROPHILS # BLD: 5.8 THOU/UL (ref 1.4–8.2)
PLATELET # BLD: 387 THOU/UL (ref 150–400)
POTASSIUM SERPL-SCNC: 4 MMOL/L (ref 3.5–5.1)
PROT SERPL-MCNC: 6.8 G/DL (ref 6.4–8.2)
RBC # BLD AUTO: 4.83 MIL/UL (ref 4.5–6)
SODIUM SERPL-SCNC: 135 MMOL/L (ref 136–145)
TROPONIN I SERPL-MCNC: < 0.04 NG/ML
WBC # BLD AUTO: 12.2 THOU/UL (ref 4–11)

## 2017-09-09 ENCOUNTER — HOSPITAL ENCOUNTER (EMERGENCY)
Dept: HOSPITAL 35 - ER | Age: 62
Discharge: LEFT BEFORE DISCHARGE | End: 2017-09-09
Payer: COMMERCIAL

## 2017-09-09 DIAGNOSIS — Z53.21: Primary | ICD-10-CM

## 2018-03-31 ENCOUNTER — HOSPITAL ENCOUNTER (EMERGENCY)
Dept: HOSPITAL 96 - M.ERS | Age: 63
Discharge: HOME | End: 2018-03-31
Payer: MEDICAID

## 2018-03-31 VITALS — SYSTOLIC BLOOD PRESSURE: 124 MMHG | DIASTOLIC BLOOD PRESSURE: 80 MMHG

## 2018-03-31 VITALS — BODY MASS INDEX: 24.2 KG/M2 | WEIGHT: 169.01 LBS | HEIGHT: 70 IN

## 2018-03-31 DIAGNOSIS — L30.9: Primary | ICD-10-CM

## 2018-04-11 ENCOUNTER — HOSPITAL ENCOUNTER (EMERGENCY)
Dept: HOSPITAL 96 - M.ERS | Age: 63
Discharge: HOME | End: 2018-04-11
Payer: MEDICAID

## 2018-04-11 VITALS — HEIGHT: 70 IN | WEIGHT: 160.01 LBS | BODY MASS INDEX: 22.91 KG/M2

## 2018-04-11 VITALS — DIASTOLIC BLOOD PRESSURE: 79 MMHG | SYSTOLIC BLOOD PRESSURE: 137 MMHG

## 2018-04-11 DIAGNOSIS — E11.9: Primary | ICD-10-CM

## 2018-04-11 DIAGNOSIS — Z79.4: ICD-10-CM

## 2018-04-11 DIAGNOSIS — I25.10: ICD-10-CM

## 2018-04-11 DIAGNOSIS — E78.00: ICD-10-CM

## 2018-04-11 DIAGNOSIS — Z91.041: ICD-10-CM

## 2018-04-11 DIAGNOSIS — Z88.5: ICD-10-CM

## 2018-04-11 DIAGNOSIS — J44.9: ICD-10-CM

## 2018-04-11 DIAGNOSIS — Z91.040: ICD-10-CM

## 2018-04-11 DIAGNOSIS — Z88.8: ICD-10-CM

## 2018-04-11 DIAGNOSIS — F17.200: ICD-10-CM

## 2018-04-11 DIAGNOSIS — Z88.6: ICD-10-CM

## 2018-04-11 DIAGNOSIS — I10: ICD-10-CM

## 2018-04-11 DIAGNOSIS — Z95.5: ICD-10-CM

## 2018-04-11 DIAGNOSIS — Z88.0: ICD-10-CM

## 2019-05-07 ENCOUNTER — HOSPITAL ENCOUNTER (INPATIENT)
Dept: HOSPITAL 96 - M.ERS | Age: 64
LOS: 1 days | Discharge: LEFT BEFORE DISCHARGE | DRG: 302 | End: 2019-05-08
Attending: INTERNAL MEDICINE | Admitting: INTERNAL MEDICINE
Payer: MEDICAID

## 2019-05-07 VITALS — WEIGHT: 158 LBS | BODY MASS INDEX: 22.12 KG/M2 | HEIGHT: 71 IN

## 2019-05-07 VITALS — SYSTOLIC BLOOD PRESSURE: 124 MMHG | DIASTOLIC BLOOD PRESSURE: 56 MMHG

## 2019-05-07 VITALS — DIASTOLIC BLOOD PRESSURE: 47 MMHG | SYSTOLIC BLOOD PRESSURE: 134 MMHG

## 2019-05-07 VITALS — DIASTOLIC BLOOD PRESSURE: 56 MMHG | SYSTOLIC BLOOD PRESSURE: 124 MMHG

## 2019-05-07 VITALS — SYSTOLIC BLOOD PRESSURE: 154 MMHG | DIASTOLIC BLOOD PRESSURE: 64 MMHG

## 2019-05-07 DIAGNOSIS — Z91.040: ICD-10-CM

## 2019-05-07 DIAGNOSIS — Z79.02: ICD-10-CM

## 2019-05-07 DIAGNOSIS — Z88.5: ICD-10-CM

## 2019-05-07 DIAGNOSIS — I25.2: ICD-10-CM

## 2019-05-07 DIAGNOSIS — Z79.899: ICD-10-CM

## 2019-05-07 DIAGNOSIS — J15.6: ICD-10-CM

## 2019-05-07 DIAGNOSIS — J96.01: ICD-10-CM

## 2019-05-07 DIAGNOSIS — Z88.0: ICD-10-CM

## 2019-05-07 DIAGNOSIS — Z91.041: ICD-10-CM

## 2019-05-07 DIAGNOSIS — E11.65: ICD-10-CM

## 2019-05-07 DIAGNOSIS — Z98.1: ICD-10-CM

## 2019-05-07 DIAGNOSIS — Z79.82: ICD-10-CM

## 2019-05-07 DIAGNOSIS — F41.9: ICD-10-CM

## 2019-05-07 DIAGNOSIS — J44.1: ICD-10-CM

## 2019-05-07 DIAGNOSIS — F17.290: ICD-10-CM

## 2019-05-07 DIAGNOSIS — E78.5: ICD-10-CM

## 2019-05-07 DIAGNOSIS — Z53.21: ICD-10-CM

## 2019-05-07 DIAGNOSIS — Z79.4: ICD-10-CM

## 2019-05-07 DIAGNOSIS — I10: ICD-10-CM

## 2019-05-07 DIAGNOSIS — Z95.5: ICD-10-CM

## 2019-05-07 DIAGNOSIS — J44.0: ICD-10-CM

## 2019-05-07 DIAGNOSIS — I25.119: Primary | ICD-10-CM

## 2019-05-07 DIAGNOSIS — Z91.14: ICD-10-CM

## 2019-05-07 DIAGNOSIS — Z88.8: ICD-10-CM

## 2019-05-07 LAB
%HYPO/RBC NFR BLD AUTO: (no result) %
ABSOLUTE BASOPHILS: 0.2 THOU/UL (ref 0–0.2)
ABSOLUTE EOSINOPHILS: 0.3 THOU/UL (ref 0–0.7)
ABSOLUTE MONOCYTES: 0.5 THOU/UL (ref 0–1.2)
ALBUMIN SERPL-MCNC: 2.9 G/DL (ref 3.4–5)
ALP SERPL-CCNC: 110 U/L (ref 46–116)
ALT SERPL-CCNC: 18 U/L (ref 30–65)
ANION GAP SERPL CALC-SCNC: 8 MMOL/L (ref 7–16)
ANISOCYTOSIS BLD QL SMEAR: (no result)
AST SERPL-CCNC: 17 U/L (ref 15–37)
BASOPHILS NFR BLD AUTO: 2.1 %
BILIRUB SERPL-MCNC: 0.2 MG/DL
BILIRUB UR-MCNC: NEGATIVE MG/DL
BUN SERPL-MCNC: 6 MG/DL (ref 7–18)
CALCIUM SERPL-MCNC: 8.7 MG/DL (ref 8.5–10.1)
CHLORIDE SERPL-SCNC: 101 MMOL/L (ref 98–107)
CO2 SERPL-SCNC: 28 MMOL/L (ref 21–32)
COLOR UR: YELLOW
CREAT SERPL-MCNC: 0.9 MG/DL (ref 0.6–1.3)
EOSINOPHIL NFR BLD: 3.9 %
GLUCOSE SERPL-MCNC: 481 MG/DL (ref 70–99)
GRANULOCYTES NFR BLD MANUAL: 58.7 %
HCT VFR BLD CALC: 32.3 % (ref 42–52)
HGB BLD-MCNC: 10.1 GM/DL (ref 14–18)
INR PPP: 1
KETONES UR STRIP-MCNC: NEGATIVE MG/DL
LIPASE: 130 U/L (ref 73–393)
LYMPHOCYTES # BLD: 2.3 THOU/UL (ref 0.8–5.3)
LYMPHOCYTES NFR BLD AUTO: 29.3 %
MCH RBC QN AUTO: 22.9 PG (ref 26–34)
MCHC RBC AUTO-ENTMCNC: 31.4 G/DL (ref 28–37)
MCV RBC: 73.1 FL (ref 80–100)
MICROCYTES: (no result)
MONOCYTES NFR BLD: 6 %
MPV: 8 FL. (ref 7.2–11.1)
NEUTROPHILS # BLD: 4.6 THOU/UL (ref 1.6–8.1)
NT-PRO BRAIN NAT PEPTIDE: 1785 PG/ML (ref ?–300)
NUCLEATED RBCS: 0 /100WBC
PLATELET # BLD EST: (no result) 10*3/UL
PLATELET COUNT*: 473 THOU/UL (ref 150–400)
POTASSIUM SERPL-SCNC: 4.6 MMOL/L (ref 3.5–5.1)
PROT SERPL-MCNC: 6.9 G/DL (ref 6.4–8.2)
PROT UR QL STRIP: NEGATIVE
PROTHROMBIN TIME: 10.7 SECONDS (ref 9.2–11.5)
RBC # BLD AUTO: 4.42 MIL/UL (ref 4.5–6)
RBC # UR STRIP: NEGATIVE /UL
RDW-CV: 18.4 % (ref 10.5–14.5)
SODIUM SERPL-SCNC: 137 MMOL/L (ref 136–145)
SP GR UR STRIP: <= 1.005 (ref 1–1.03)
URINE CLARITY: CLEAR
URINE GLUCOSE-RANDOM: (no result)
URINE LEUKOCYTES-REFLEX: NEGATIVE
URINE NITRITE-REFLEX: NEGATIVE
UROBILINOGEN UR STRIP-ACNC: 0.2 E.U./DL (ref 0.2–1)
WBC # BLD AUTO: 7.8 THOU/UL (ref 4–11)

## 2019-05-08 VITALS — SYSTOLIC BLOOD PRESSURE: 99 MMHG | DIASTOLIC BLOOD PRESSURE: 42 MMHG

## 2019-05-08 VITALS — DIASTOLIC BLOOD PRESSURE: 53 MMHG | SYSTOLIC BLOOD PRESSURE: 123 MMHG

## 2019-05-08 VITALS — SYSTOLIC BLOOD PRESSURE: 122 MMHG | DIASTOLIC BLOOD PRESSURE: 53 MMHG

## 2019-05-08 LAB
CHOLEST SERPL-MCNC: 104 MG/DL (ref ?–200)
HDLC SERPL-MCNC: 40 MG/DL (ref 40–?)
LDLC SERPL-MCNC: 48 MG/DL (ref ?–100)
TC:HDL: 2.6 RATIO
TRIGL SERPL-MCNC: 84 MG/DL (ref ?–150)
VLDLC SERPL CALC-MCNC: 17 MG/DL (ref ?–40)

## 2019-05-08 NOTE — EKG
[place] [address]
Phone:  [phone]                     ELECTROCARDIOGRAM REPORT      
_______________________________________________________________________________
 
Name:       DANDRE SCHULTZ                  Room:           [address]    ADM IN  
[facility].#:  [id number]      Account #:      [id number]  
Admission:  19     Attend Phys:    She Rodriguez MD 
Discharge:               Date of Birth:  [birth year]  
         Report #: [id number]
    [id number]
_______________________________________________________________________________
THIS REPORT FOR:  //name//                      
 
                         [facility] ED
                                       
Test Date:    2019               Test Time:    19:36:43
Pat Name:     DANDRE SCHULTZ              Department:   
Patient ID:   [id number]            Room:         [facility]
Gender:                               Technician:   AMY
:          1955               Requested By: Debi Zavala
Order Number: [id number] MD:   James Harrison
                                 Measurements
Intervals                              Axis          
Rate:         92                       P:            88
MT:           187                      QRS:          72
QRSD:         92                       T:            88
QT:           391                                    
QTc:          484                                    
                           Interpretive Statements
Sinus rhythm
Consider anterior infarct
Borderline repolarization abnormality
Compared to ECG 2017 20:30:47
Myocardial infarct finding now present
ST (T wave) deviation persists
 
Electronically Signed On 2019 10:33:51 CDT by James Harrison
[email or web address]
 
 
 
 
 
 
 
 
 
 
 
 
 
 
 
 
  <ELECTRONICALLY SIGNED>
                                           By: James Harrison MD, [facility]     
  19     1033
D: 19 193   _____________________________________
T: 19 1936   James Harrison MD, [facility]       /EPI

## 2019-05-09 LAB
EST. AVERAGE GLUCOSE BLD GHB EST-MCNC: 332 MG/DL
GLYCOHEMOGLOBIN (HGB A1C): 13.2 % (ref 4.8–5.6)

## 2019-05-10 ENCOUNTER — HOSPITAL ENCOUNTER (EMERGENCY)
Dept: HOSPITAL 96 - M.ERS | Age: 64
Discharge: HOME | End: 2019-05-10
Payer: MEDICAID

## 2019-05-10 VITALS — DIASTOLIC BLOOD PRESSURE: 59 MMHG | SYSTOLIC BLOOD PRESSURE: 142 MMHG

## 2019-05-10 VITALS — WEIGHT: 157.01 LBS | BODY MASS INDEX: 23.8 KG/M2 | HEIGHT: 68 IN

## 2019-05-10 DIAGNOSIS — Z91.041: ICD-10-CM

## 2019-05-10 DIAGNOSIS — Z88.8: ICD-10-CM

## 2019-05-10 DIAGNOSIS — E11.9: ICD-10-CM

## 2019-05-10 DIAGNOSIS — J18.9: Primary | ICD-10-CM

## 2019-05-10 DIAGNOSIS — Z79.4: ICD-10-CM

## 2019-05-10 DIAGNOSIS — E78.00: ICD-10-CM

## 2019-05-10 DIAGNOSIS — E78.5: ICD-10-CM

## 2019-05-10 DIAGNOSIS — Z88.0: ICD-10-CM

## 2019-05-10 DIAGNOSIS — Z88.6: ICD-10-CM

## 2019-05-10 DIAGNOSIS — J44.9: ICD-10-CM

## 2019-05-10 DIAGNOSIS — Z91.048: ICD-10-CM

## 2019-05-10 DIAGNOSIS — Z88.5: ICD-10-CM

## 2019-05-10 DIAGNOSIS — Z88.4: ICD-10-CM

## 2019-05-10 DIAGNOSIS — I25.10: ICD-10-CM

## 2019-05-10 DIAGNOSIS — I10: ICD-10-CM

## 2019-05-10 DIAGNOSIS — Z91.040: ICD-10-CM

## 2019-05-10 DIAGNOSIS — F17.220: ICD-10-CM

## 2019-05-10 LAB
%HYPO/RBC NFR BLD AUTO: (no result) %
ABSOLUTE BASOPHILS: 0 THOU/UL (ref 0–0.2)
ABSOLUTE EOSINOPHILS: 0.2 THOU/UL (ref 0–0.7)
ABSOLUTE MONOCYTES: 0.3 THOU/UL (ref 0–1.2)
ALBUMIN SERPL-MCNC: 2.9 G/DL (ref 3.4–5)
ALP SERPL-CCNC: 114 U/L (ref 46–116)
ALT SERPL-CCNC: 17 U/L (ref 30–65)
ANION GAP SERPL CALC-SCNC: 10 MMOL/L (ref 7–16)
ANISOCYTOSIS BLD QL SMEAR: (no result)
AST SERPL-CCNC: 7 U/L (ref 15–37)
BASOPHILS NFR BLD AUTO: 0.2 %
BILIRUB SERPL-MCNC: 0.3 MG/DL
BUN SERPL-MCNC: 8 MG/DL (ref 7–18)
CALCIUM SERPL-MCNC: 8.5 MG/DL (ref 8.5–10.1)
CHLORIDE SERPL-SCNC: 99 MMOL/L (ref 98–107)
CO2 SERPL-SCNC: 27 MMOL/L (ref 21–32)
CREAT SERPL-MCNC: 0.9 MG/DL (ref 0.6–1.3)
EOSINOPHIL NFR BLD: 3.1 %
GLUCOSE SERPL-MCNC: 497 MG/DL (ref 70–99)
GRANULOCYTES NFR BLD MANUAL: 68.2 %
HCT VFR BLD CALC: 30 % (ref 42–52)
HGB BLD-MCNC: 9.4 GM/DL (ref 14–18)
INR PPP: 1
LIPASE: 95 U/L (ref 73–393)
LYMPHOCYTES # BLD: 1.8 THOU/UL (ref 0.8–5.3)
LYMPHOCYTES NFR BLD AUTO: 24.1 %
MCH RBC QN AUTO: 22.8 PG (ref 26–34)
MCHC RBC AUTO-ENTMCNC: 31.4 G/DL (ref 28–37)
MCV RBC: 72.6 FL (ref 80–100)
MICROCYTES: (no result)
MONOCYTES NFR BLD: 4.4 %
MPV: 7.7 FL. (ref 7.2–11.1)
NEUTROPHILS # BLD: 5.1 THOU/UL (ref 1.6–8.1)
NT-PRO BRAIN NAT PEPTIDE: 2000 PG/ML (ref ?–300)
NUCLEATED RBCS: 0 /100WBC
OVALOCYTES BLD QL SMEAR: (no result)
PLATELET # BLD EST: (no result) 10*3/UL
PLATELET COUNT*: 468 THOU/UL (ref 150–400)
POIKILOCYTOSIS BLD QL SMEAR: (no result)
POTASSIUM SERPL-SCNC: 4 MMOL/L (ref 3.5–5.1)
PROT SERPL-MCNC: 6.6 G/DL (ref 6.4–8.2)
PROTHROMBIN TIME: 10.6 SECONDS (ref 9.2–11.5)
RBC # BLD AUTO: 4.13 MIL/UL (ref 4.5–6)
RDW-CV: 18.1 % (ref 10.5–14.5)
SODIUM SERPL-SCNC: 136 MMOL/L (ref 136–145)
TROPONIN-I LEVEL: <0.06 NG/ML (ref ?–0.06)
WBC # BLD AUTO: 7.4 THOU/UL (ref 4–11)

## 2019-05-12 NOTE — EKG
[place] [address]
Phone:  [phone]                     ELECTROCARDIOGRAM REPORT      
_______________________________________________________________________________
 
Name:       DANDRE SCHULTZ                  Room:                      [facility]#:  [id number]      Account #:      [id number]  
Admission:  05/10/19     Attend Phys:                         
Discharge:  05/10/19     Date of Birth:  [birth year]  
         Report #: [id number]
    [id number]
_______________________________________________________________________________
THIS REPORT FOR:  //name//                      
 
                         [facility] ED
                                       
Test Date:    2019-05-10               Test Time:    17:37:09
Pat Name:     DANDRE SCHULTZ              Department:   
Patient ID:   [id number]            Room:          
Gender:       M                        Technician:   MAREN
:          1955               Requested By: Romina Gonzales
Order Number: [id number] MD:   Adolfo Farias
                                 Measurements
Intervals                              Axis          
Rate:         100                      P:            102
MD:           185                      QRS:          58
QRSD:         82                       T:            186
QT:           321                                    
QTc:          414                                    
                           Interpretive Statements
Sinus tachycardia
Ventricular premature complex
Nonspecific repol abnormality, lateral leads
Compared to ECG 2019 19:36:43
Ventricular premature complex(es) now present
Sinus rhythm no longer present
Myocardial infarct finding no longer present
 
Electronically Signed On 2019 11:37:14 CDT by Adolfo Farias
[email or web address]
 
 
 
 
 
 
 
 
 
 
 
 
 
 
 
  <ELECTRONICALLY SIGNED>
                                           By: Adolfo Farias MD, FAC   
  19     1137
D: 05/10/19 1737   _____________________________________
T: 05/10/19 173   Adolfo Farias MD, [facility]     /EPI

## 2019-06-06 ENCOUNTER — HOSPITAL ENCOUNTER (INPATIENT)
Dept: HOSPITAL 96 - M.ERS | Age: 64
LOS: 1 days | Discharge: LEFT BEFORE DISCHARGE | DRG: 313 | End: 2019-06-07
Attending: INTERNAL MEDICINE | Admitting: INTERNAL MEDICINE
Payer: MEDICAID

## 2019-06-06 VITALS — WEIGHT: 165 LBS | HEIGHT: 69.02 IN | BODY MASS INDEX: 24.44 KG/M2

## 2019-06-06 VITALS — DIASTOLIC BLOOD PRESSURE: 34 MMHG | SYSTOLIC BLOOD PRESSURE: 122 MMHG

## 2019-06-06 VITALS — DIASTOLIC BLOOD PRESSURE: 68 MMHG | SYSTOLIC BLOOD PRESSURE: 118 MMHG

## 2019-06-06 VITALS — SYSTOLIC BLOOD PRESSURE: 143 MMHG | DIASTOLIC BLOOD PRESSURE: 78 MMHG

## 2019-06-06 DIAGNOSIS — Z88.8: ICD-10-CM

## 2019-06-06 DIAGNOSIS — Z88.6: ICD-10-CM

## 2019-06-06 DIAGNOSIS — I10: ICD-10-CM

## 2019-06-06 DIAGNOSIS — I25.2: ICD-10-CM

## 2019-06-06 DIAGNOSIS — Z91.041: ICD-10-CM

## 2019-06-06 DIAGNOSIS — Z88.0: ICD-10-CM

## 2019-06-06 DIAGNOSIS — Z91.040: ICD-10-CM

## 2019-06-06 DIAGNOSIS — E11.9: ICD-10-CM

## 2019-06-06 DIAGNOSIS — I25.10: ICD-10-CM

## 2019-06-06 DIAGNOSIS — Z53.21: ICD-10-CM

## 2019-06-06 DIAGNOSIS — R07.9: Primary | ICD-10-CM

## 2019-06-06 DIAGNOSIS — Z95.5: ICD-10-CM

## 2019-06-06 DIAGNOSIS — J44.9: ICD-10-CM

## 2019-06-06 DIAGNOSIS — E78.00: ICD-10-CM

## 2019-06-06 DIAGNOSIS — E78.5: ICD-10-CM

## 2019-06-06 LAB
%HYPO/RBC NFR BLD AUTO: (no result) %
ABSOLUTE BASOPHILS: 0.1 THOU/UL (ref 0–0.2)
ABSOLUTE EOSINOPHILS: 0.2 THOU/UL (ref 0–0.7)
ABSOLUTE MONOCYTES: 0.4 THOU/UL (ref 0–1.2)
ALBUMIN SERPL-MCNC: 3.3 G/DL (ref 3.4–5)
ALP SERPL-CCNC: 146 U/L (ref 46–116)
ALT SERPL-CCNC: 20 U/L (ref 30–65)
ANION GAP SERPL CALC-SCNC: 7 MMOL/L (ref 7–16)
ANISOCYTOSIS BLD QL SMEAR: (no result)
AST SERPL-CCNC: 8 U/L (ref 15–37)
BASOPHILS NFR BLD AUTO: 1.4 %
BE: 0.8 MMOL/L
BILIRUB SERPL-MCNC: 0.4 MG/DL
BUN SERPL-MCNC: 6 MG/DL (ref 7–18)
CALCIUM SERPL-MCNC: 8.7 MG/DL (ref 8.5–10.1)
CHLORIDE SERPL-SCNC: 94 MMOL/L (ref 98–107)
CO2 SERPL-SCNC: 29 MMOL/L (ref 21–32)
CREAT SERPL-MCNC: 1 MG/DL (ref 0.6–1.3)
EOSINOPHIL NFR BLD: 2.6 %
GLUCOSE SERPL-MCNC: 668 MG/DL (ref 70–99)
GRANULOCYTES NFR BLD MANUAL: 61.5 %
HCT VFR BLD CALC: 34.1 % (ref 42–52)
HGB BLD-MCNC: 10.3 GM/DL (ref 14–18)
LIPASE: 140 U/L (ref 73–393)
LYMPHOCYTES # BLD: 1.7 THOU/UL (ref 0.8–5.3)
LYMPHOCYTES NFR BLD AUTO: 28.7 %
MAGNESIUM SERPL-MCNC: 1.7 MG/DL (ref 1.8–2.4)
MCH RBC QN AUTO: 21.5 PG (ref 26–34)
MCHC RBC AUTO-ENTMCNC: 30.2 G/DL (ref 28–37)
MCV RBC: 71.2 FL (ref 80–100)
MICROCYTES: (no result)
MONOCYTES NFR BLD: 5.8 %
MPV: 7.9 FL. (ref 7.2–11.1)
NEUTROPHILS # BLD: 3.7 THOU/UL (ref 1.6–8.1)
NT-PRO BRAIN NAT PEPTIDE: 1267 PG/ML (ref ?–300)
NUCLEATED RBCS: 0 /100WBC
PCO2 BLD: 46.9 MMHG (ref 35–45)
PLATELET # BLD EST: (no result) 10*3/UL
PLATELET COUNT*: 523 THOU/UL (ref 150–400)
PO2 BLD: 73.7 MMHG (ref 75–100)
POTASSIUM SERPL-SCNC: 4.4 MMOL/L (ref 3.5–5.1)
PROT SERPL-MCNC: 7 G/DL (ref 6.4–8.2)
RBC # BLD AUTO: 4.79 MIL/UL (ref 4.5–6)
RDW-CV: 18.9 % (ref 10.5–14.5)
SODIUM SERPL-SCNC: 130 MMOL/L (ref 136–145)
TROPONIN-I LEVEL: <0.06 NG/ML (ref ?–0.06)
WBC # BLD AUTO: 6.1 THOU/UL (ref 4–11)

## 2019-06-07 VITALS — DIASTOLIC BLOOD PRESSURE: 72 MMHG | SYSTOLIC BLOOD PRESSURE: 113 MMHG

## 2019-06-07 VITALS — SYSTOLIC BLOOD PRESSURE: 121 MMHG | DIASTOLIC BLOOD PRESSURE: 52 MMHG

## 2019-06-07 VITALS — DIASTOLIC BLOOD PRESSURE: 57 MMHG | SYSTOLIC BLOOD PRESSURE: 96 MMHG

## 2019-06-07 LAB
ALBUMIN SERPL-MCNC: 2.9 G/DL (ref 3.4–5)
ALP SERPL-CCNC: 124 U/L (ref 46–116)
ALT SERPL-CCNC: 16 U/L (ref 30–65)
ANION GAP SERPL CALC-SCNC: 5 MMOL/L (ref 7–16)
AST SERPL-CCNC: 11 U/L (ref 15–37)
BILIRUB SERPL-MCNC: 0.3 MG/DL
BUN SERPL-MCNC: 7 MG/DL (ref 7–18)
CALCIUM SERPL-MCNC: 8.2 MG/DL (ref 8.5–10.1)
CHLORIDE SERPL-SCNC: 103 MMOL/L (ref 98–107)
CHOLEST SERPL-MCNC: 90 MG/DL (ref ?–200)
CO2 SERPL-SCNC: 29 MMOL/L (ref 21–32)
CREAT SERPL-MCNC: 0.8 MG/DL (ref 0.6–1.3)
GLUCOSE SERPL-MCNC: 244 MG/DL (ref 70–99)
HDLC SERPL-MCNC: 33 MG/DL (ref 40–?)
LDLC SERPL-MCNC: 42 MG/DL (ref ?–100)
POTASSIUM SERPL-SCNC: 4.2 MMOL/L (ref 3.5–5.1)
PROT SERPL-MCNC: 6.4 G/DL (ref 6.4–8.2)
SODIUM SERPL-SCNC: 137 MMOL/L (ref 136–145)
TC:HDL: 2.7 RATIO
TRIGL SERPL-MCNC: 75 MG/DL (ref ?–150)
VLDLC SERPL CALC-MCNC: 15 MG/DL (ref ?–40)

## 2019-06-07 NOTE — NUR
PT RECIEVED FROM ED. ALERT AND ORIENTED X4. CALL LIGHT WITHIN REACH AND BED IN
LOW POSITION. SAT MAINTAINED IN O2. C/O PAIN, MEDICATION GIVEN PER EMAR. PT
HAD LFT SIDED WEAKNESS, PHYSICIAN INFORMED, ORDERS RECIEVED AND IMPLEMENTED.
NIH DONE AND CHARTED. EKG CHANGES NOTED, PHYSICIAN NOTIFIED NO ORDERS
RECIEVED. HOURLY ROUNDING DONE FOR PT SAFETY.

## 2019-06-07 NOTE — NUR
RECEIVED PHONECALL FROM AFTER HOURS RADIOLOGY SERVICE, RESULTS INTERPRETED AND
SENT IN VIA FAX BY [facility].  DR DAVIS NOTIFIED OF RESULTS.

## 2019-06-07 NOTE — EKG
[place] [address]
Phone:  [phone]                     ELECTROCARDIOGRAM REPORT      
_______________________________________________________________________________
 
Name:       DANDRE SCHULTZ                  Room:           [address]    ADM IN  
.R.#:  [id number]      Account #:      [id number]  
Admission:  19     Attend Phys:    CHER Hollis
Discharge:               Date of Birth:  [birth year]  
         Report #: [id number]
    [id number]
_______________________________________________________________________________
THIS REPORT FOR:  //name//                      
 
                          [facility]
                                       
Test Date:    2019               Test Time:    22:16:10
Pat Name:     DANDRE SCHULTZ              Department:   
Patient ID:   [id number]            Room:         [facility]
Gender:                               Technician:   JAIR
:          1955               Requested By: Mitul Ascencio
Order Number: [id number] MD:   Miguel Angel Hoff
                                 Measurements
Intervals                              Axis          
Rate:         87                       P:            75
ND:           179                      QRS:          73
QRSD:         93                       T:            160
QT:           393                                    
QTc:          473                                    
                           Interpretive Statements
Sinus rhythm
Borderline repolarization abnormality
Baseline wander in lead(s) III,V1
 
Electronically Signed On 2019 9:00:28 CDT by Miguel Angel Hoff
[email or web address]
 
 
 
 
 
 
 
 
 
 
 
 
 
 
 
 
 
 
 
  <ELECTRONICALLY SIGNED>
                                           By: Miguel Angel Hoff MD, [facility]      
  19     09
D: 19   _____________________________________
T: 19   Miguel Angel Hoff MD, FACC        /EPI

## 2019-06-07 NOTE — NUR
PT LEFT AMA AT 0905 THIS AM. ATTEMPTED TO EDUCATE PT ON NEED FOR
HOSPITALIZATION AND DANGERS OF LEAVING INCLUDING WORSONING OF SYMPTOMS OF
STROKE AND BLOOD GLUCOSE AND THE POSSIBILITY OF DEATH R/T IT. PT WAS NOT
WILLING TO LISTEN AND INSISTED HE WAS LEAVING. CHARGE NURSE ANNABELLE NOTIFIED
AND SPOKE WITH PT R/T DANGERS OF LEAVING ALSO. DR العلي CAME TO TALK WITH PT
BUT HE REFUSED TO STAY TO SPEAK WITH HIM. IV ACCESS X2 REMOVED. PT LEFT
AMBULATORY STATING THAT HE HAD A RIDE. ESCORTED TO MAIN ENTRANCE BY RAMESH HENDERSON.

## 2019-06-07 NOTE — EKG
[place] [address]
Phone:  [phone]                     ELECTROCARDIOGRAM REPORT      
_______________________________________________________________________________
 
Name:       DANDRE SCHULTZ                  Room:           [address]    ADM [place]  
.R.#:  [id number]      Account #:      [id number]  
Admission:  19     Attend Phys:    CHER Hollis
Discharge:               Date of Birth:  [birth year]  
         Report #: [id number]
    [id number]
_______________________________________________________________________________
THIS REPORT FOR:  //name//                      
 
                         [facility] ED
                                       
Test Date:    2019               Test Time:    17:16:21
Pat Name:     DANDRE SCHULTZ              Department:   
Patient ID:   [id number]            Room:         [address]
Gender:                               Technician:   AWA SELBY
:          1955               Requested By: Cora Gray
Order Number: [id number]    Wai MD:   Miguel Angel Hoff
                                 Measurements
Intervals                              Axis          
Rate:         107                      P:            87
IL:           184                      QRS:          80
QRSD:         93                       T:            42
QT:           344                                    
QTc:          459                                    
                           Interpretive Statements
Sinus tachycardia
Nonspecific repol abnormality, lateral leads
Compared to ECG 05/10/2019 17:37:09
Ventricular premature complex(es) no longer present
 
Electronically Signed On 2019 8:57:49 CDT by Miguel Angel Hoff
[email or web address]
 
 
 
 
 
 
 
 
 
 
 
 
 
 
 
 
 
 
  <ELECTRONICALLY SIGNED>
                                           By: Miguel Angel Hoff MD, [facility]      
  19     0857
D: 19 1716   _____________________________________
T: 19 1716   Miguel Angel Hoff MD, [facility]        /EPI

## 2019-07-08 ENCOUNTER — HOSPITAL ENCOUNTER (INPATIENT)
Dept: HOSPITAL 96 - M.ERS | Age: 64
LOS: 4 days | Discharge: LEFT BEFORE DISCHARGE | DRG: 286 | End: 2019-07-12
Attending: INTERNAL MEDICINE | Admitting: INTERNAL MEDICINE
Payer: MEDICAID

## 2019-07-08 VITALS — SYSTOLIC BLOOD PRESSURE: 123 MMHG | DIASTOLIC BLOOD PRESSURE: 54 MMHG

## 2019-07-08 VITALS — SYSTOLIC BLOOD PRESSURE: 109 MMHG | DIASTOLIC BLOOD PRESSURE: 68 MMHG

## 2019-07-08 VITALS — BODY MASS INDEX: 22.16 KG/M2 | HEIGHT: 70 IN | WEIGHT: 154.76 LBS

## 2019-07-08 DIAGNOSIS — Z53.21: ICD-10-CM

## 2019-07-08 DIAGNOSIS — I25.10: Primary | ICD-10-CM

## 2019-07-08 DIAGNOSIS — E11.621: ICD-10-CM

## 2019-07-08 DIAGNOSIS — I71.4: ICD-10-CM

## 2019-07-08 DIAGNOSIS — I70.213: ICD-10-CM

## 2019-07-08 DIAGNOSIS — J96.01: ICD-10-CM

## 2019-07-08 DIAGNOSIS — Z88.5: ICD-10-CM

## 2019-07-08 DIAGNOSIS — E11.51: ICD-10-CM

## 2019-07-08 DIAGNOSIS — D50.9: ICD-10-CM

## 2019-07-08 DIAGNOSIS — Z91.041: ICD-10-CM

## 2019-07-08 DIAGNOSIS — Z79.4: ICD-10-CM

## 2019-07-08 DIAGNOSIS — Z86.711: ICD-10-CM

## 2019-07-08 DIAGNOSIS — L97.529: ICD-10-CM

## 2019-07-08 DIAGNOSIS — E11.649: ICD-10-CM

## 2019-07-08 DIAGNOSIS — F17.210: ICD-10-CM

## 2019-07-08 DIAGNOSIS — Z88.8: ICD-10-CM

## 2019-07-08 DIAGNOSIS — I10: ICD-10-CM

## 2019-07-08 DIAGNOSIS — J44.0: ICD-10-CM

## 2019-07-08 DIAGNOSIS — Z88.0: ICD-10-CM

## 2019-07-08 DIAGNOSIS — Z91.040: ICD-10-CM

## 2019-07-08 DIAGNOSIS — J18.9: ICD-10-CM

## 2019-07-08 DIAGNOSIS — Z95.5: ICD-10-CM

## 2019-07-08 DIAGNOSIS — J44.1: ICD-10-CM

## 2019-07-08 DIAGNOSIS — E11.69: ICD-10-CM

## 2019-07-08 DIAGNOSIS — I65.22: ICD-10-CM

## 2019-07-08 DIAGNOSIS — Z79.899: ICD-10-CM

## 2019-07-08 DIAGNOSIS — E11.40: ICD-10-CM

## 2019-07-08 DIAGNOSIS — E78.00: ICD-10-CM

## 2019-07-08 DIAGNOSIS — I25.2: ICD-10-CM

## 2019-07-08 DIAGNOSIS — M86.672: ICD-10-CM

## 2019-07-08 DIAGNOSIS — L03.116: ICD-10-CM

## 2019-07-08 DIAGNOSIS — Z91.14: ICD-10-CM

## 2019-07-08 DIAGNOSIS — Z98.1: ICD-10-CM

## 2019-07-08 DIAGNOSIS — F41.9: ICD-10-CM

## 2019-07-08 DIAGNOSIS — E11.9: ICD-10-CM

## 2019-07-08 DIAGNOSIS — Z79.02: ICD-10-CM

## 2019-07-08 LAB
%HYPO/RBC NFR BLD AUTO: (no result) %
ABSOLUTE BASOPHILS: 0.1 THOU/UL (ref 0–0.2)
ABSOLUTE EOSINOPHILS: 0.4 THOU/UL (ref 0–0.7)
ABSOLUTE MONOCYTES: 0.5 THOU/UL (ref 0–1.2)
ALBUMIN SERPL-MCNC: 2.8 G/DL (ref 3.4–5)
ALP SERPL-CCNC: 114 U/L (ref 46–116)
ALT SERPL-CCNC: 17 U/L (ref 30–65)
ANION GAP SERPL CALC-SCNC: 7 MMOL/L (ref 7–16)
ANISOCYTOSIS BLD QL SMEAR: (no result)
APTT BLD: 23.7 SECONDS (ref 25–31.3)
AST SERPL-CCNC: 10 U/L (ref 15–37)
BASOPHILS NFR BLD AUTO: 1.5 %
BILIRUB SERPL-MCNC: 0.2 MG/DL
BUN SERPL-MCNC: 3 MG/DL (ref 7–18)
CALCIUM SERPL-MCNC: 8.2 MG/DL (ref 8.5–10.1)
CHLORIDE SERPL-SCNC: 100 MMOL/L (ref 98–107)
CK-MB MASS: 4.9 NG/ML
CO2 SERPL-SCNC: 29 MMOL/L (ref 21–32)
CREAT SERPL-MCNC: 0.8 MG/DL (ref 0.6–1.3)
EOSINOPHIL NFR BLD: 7.2 %
GLUCOSE SERPL-MCNC: 515 MG/DL (ref 70–99)
GRANULOCYTES NFR BLD MANUAL: 50 %
HCT VFR BLD CALC: 28.8 % (ref 42–52)
HGB BLD-MCNC: 8.6 GM/DL (ref 14–18)
INR PPP: 1
LIPASE: 107 U/L (ref 73–393)
LYMPHOCYTES # BLD: 1.8 THOU/UL (ref 0.8–5.3)
LYMPHOCYTES NFR BLD AUTO: 32.4 %
MAGNESIUM SERPL-MCNC: 1.6 MG/DL (ref 1.8–2.4)
MCH RBC QN AUTO: 20.4 PG (ref 26–34)
MCHC RBC AUTO-ENTMCNC: 30 G/DL (ref 28–37)
MCV RBC: 68.1 FL (ref 80–100)
MICROCYTES: (no result)
MONOCYTES NFR BLD: 8.9 %
MPV: 7.2 FL. (ref 7.2–11.1)
NEUTROPHILS # BLD: 2.8 THOU/UL (ref 1.6–8.1)
NT-PRO BRAIN NAT PEPTIDE: 1171 PG/ML (ref ?–300)
NUCLEATED RBCS: 0 /100WBC
OVALOCYTES BLD QL SMEAR: (no result)
PLATELET # BLD EST: ADEQUATE 10*3/UL
PLATELET COUNT*: 420 THOU/UL (ref 150–400)
POTASSIUM SERPL-SCNC: 4.2 MMOL/L (ref 3.5–5.1)
PROT SERPL-MCNC: 6.4 G/DL (ref 6.4–8.2)
PROTHROMBIN TIME: 10.3 SECONDS (ref 9.2–11.5)
RBC # BLD AUTO: 4.23 MIL/UL (ref 4.5–6)
RDW-CV: 19.3 % (ref 10.5–14.5)
SODIUM SERPL-SCNC: 136 MMOL/L (ref 136–145)
TROPONIN-I LEVEL: 0.06 NG/ML (ref ?–0.06)
WBC # BLD AUTO: 5.7 THOU/UL (ref 4–11)

## 2019-07-08 NOTE — CON
[address]  [address]                    CONSULTATION                  
_______________________________________________________________________________
 
Name:       DANDRE SCHULTZ                  Room:           [address]    ADM [place]  
M.R.#:  [id number]      Account #:      [id number]  
Admission:  07/08/19     Attend Phys:    Dandre Burns MD 
Discharge:               Date of Birth:  [birth year]  
         Report #: [id number]
                                                                     [id number]  
_______________________________________________________________________________
THIS REPORT FOR:  //name//                      
 
CC: TG physician/PCP
    Dandre Burns
 
HISTORY OF PRESENT ILLNESS:  A 63-year-old  male admitted through the
ED for chest pain, radiating to the left arm.  Podiatry was consulted for
possible osteomyelitis to the left great toe with concomitant ulceration to the
lateral fifth MTP joint.  Recent catheterization found to have patent cardiac
stents.  Recent CTAs of neck show greater than 90% stenosis of the left ICA. 
Arterial Doppler ultrasound shows SFA disease with roughly 50% stenosis
bilaterally.  He relates significant pain to the right foot, denies injury.  He
has had prior triple arthrodesis with 3 retained compression staples.  Denies
fevers, chills, nausea or malaise.  Continues to have some midsternal chest
pain.  He smokes a pipe.  I reviewed his medications and allergies in the EMR. 
He is on parenteral vancomycin and ceftriaxone with good tolerance.
 
LABORATORY DATA:  WBC 10.9, RBC 3.91, hemoglobin 8.1, hematocrit 26.5, and
platelets 384.  BUN 19, creatinine 0.8, and glucose 305.
 
PHYSICAL EXAMINATION:  Temperature 98.1, pulse 84, respirations 18, and blood
pressure 87/53.  The left great toe and distal forefoot is exquisitely painful
to the touch.  The nail is missing from the left hallux with no visible open
wound to the nail bed or distal hallux.  No expressible drainage from the nail
bed, no granulation or violation of the epithelium.  There is full thickness
ulceration to the left lateral fifth MTP, roughly 3 cm in diameter with red
granular base with some overlying slough and low grade inflammation to the wound
periphery.  No exposed bone or tendon.  Faintly palpable dorsalis pedis and
posterior tibial pulses bilaterally.  No pallor, cyanosis, gangrene or signs of
acute ischemia.
 
IMAGING DATA:  I reviewed x-rays, which reveal longitudinal radiolucency through
the distal phalanx with no osteolysis.
 
IMPRESSION:  Type 2 diabetes mellitus with peripheral arterial disease, carotid
stenosis, diabetic foot ulceration, possible osteomyelitis to the great toe.
 
PLAN:  I do not recommend any surgical intervention at this point.  The patient
requires further evaluation and treatment of this carotid artery disease as well
as both lower extremities.  I am not convinced he has osteomyelitis to the great
toe, although I would recommend MRI in near future for more information.  At
present, I recommend daily wound care to the left lateral foot wound with
 
 
 
[place] [address]                    CONSULTATION                  
_______________________________________________________________________________
 
Name:       DANDRE SCHULTZ                  Room:           [address] [place]  
M.R.#:  [id number]      Account #:      [id number]  
Admission:  07/08/19     Attend Phys:    Dandre Burns MD 
Discharge:               Date of Birth:  [birth year]  
         Report #: [id number]
                                                                     [id number]  
_______________________________________________________________________________
Aquacel Ag, ABD and Kerlix gauze.  He may ambulate in a surgical shoe for short
distances and transfers as tolerated.
 
 
 
 
 
 
 
 
 
 
 
 
 
 
 
 
 
 
 
 
 
 
 
 
 
 
 
 
 
 
 
 
 
 
 
 
 
 
 
 
 
 
                       
                                        By:                                
                 
D: 07/10/19 1738_______________________________________
T: 07/11/19 0211Dlalo Ashlye DPM             /nt

## 2019-07-09 VITALS — DIASTOLIC BLOOD PRESSURE: 67 MMHG | SYSTOLIC BLOOD PRESSURE: 117 MMHG

## 2019-07-09 VITALS — SYSTOLIC BLOOD PRESSURE: 129 MMHG | DIASTOLIC BLOOD PRESSURE: 62 MMHG

## 2019-07-09 VITALS — DIASTOLIC BLOOD PRESSURE: 88 MMHG | SYSTOLIC BLOOD PRESSURE: 136 MMHG

## 2019-07-09 VITALS — DIASTOLIC BLOOD PRESSURE: 62 MMHG | SYSTOLIC BLOOD PRESSURE: 105 MMHG

## 2019-07-09 VITALS — SYSTOLIC BLOOD PRESSURE: 112 MMHG | DIASTOLIC BLOOD PRESSURE: 45 MMHG

## 2019-07-09 VITALS — SYSTOLIC BLOOD PRESSURE: 116 MMHG | DIASTOLIC BLOOD PRESSURE: 50 MMHG

## 2019-07-09 VITALS — DIASTOLIC BLOOD PRESSURE: 60 MMHG | SYSTOLIC BLOOD PRESSURE: 99 MMHG

## 2019-07-09 LAB
ANION GAP SERPL CALC-SCNC: 14 MMOL/L (ref 7–16)
BUN SERPL-MCNC: 9 MG/DL (ref 7–18)
CALCIUM SERPL-MCNC: 8.8 MG/DL (ref 8.5–10.1)
CHLORIDE SERPL-SCNC: 101 MMOL/L (ref 98–107)
CO2 SERPL-SCNC: 24 MMOL/L (ref 21–32)
CREAT SERPL-MCNC: 1 MG/DL (ref 0.6–1.3)
GLUCOSE SERPL-MCNC: 516 MG/DL (ref 70–99)
POTASSIUM SERPL-SCNC: 4.8 MMOL/L (ref 3.5–5.1)
SODIUM SERPL-SCNC: 139 MMOL/L (ref 136–145)

## 2019-07-09 PROCEDURE — B2111ZZ FLUOROSCOPY OF MULTIPLE CORONARY ARTERIES USING LOW OSMOLAR CONTRAST: ICD-10-PCS | Performed by: INTERNAL MEDICINE

## 2019-07-09 PROCEDURE — B2151ZZ FLUOROSCOPY OF LEFT HEART USING LOW OSMOLAR CONTRAST: ICD-10-PCS | Performed by: INTERNAL MEDICINE

## 2019-07-09 PROCEDURE — 4A023N7 MEASUREMENT OF CARDIAC SAMPLING AND PRESSURE, LEFT HEART, PERCUTANEOUS APPROACH: ICD-10-PCS | Performed by: INTERNAL MEDICINE

## 2019-07-09 NOTE — EKG
[place] [address]
Phone:  [phone]                     ELECTROCARDIOGRAM REPORT      
_______________________________________________________________________________
 
Name:       DANDRE SCHULTZ                  Room:           [address]    ADM IN  
M.R.#:  [id number]      Account #:      [id number]  
Admission:  19     Attend Phys:    Dandre Burns MD 
Discharge:               Date of Birth:  [birth year]  
         Report #: [id number]
    [id number]
_______________________________________________________________________________
THIS REPORT FOR:  //name//                      
 
                          [facility]
                                       
Test Date:    2019               Test Time:    05:01:36
Pat Name:     DANDRE SCHULTZ              Department:   
Patient ID:   [id number]            Room:         [address]
Gender:       M                        Technician:   [id number]
:          1955               Requested By: Dandre Burns
Order Number: [id number]    Wai MD:   Obie Daniels
                                 Measurements
Intervals                              Axis          
Rate:         102                      P:            92
VA:           43                       QRS:          26
QRSD:         149                      T:            156
QT:           410                                    
QTc:          535                                    
                           Interpretive Statements
Sinus tachycardia
Ventricular trigeminy
Left bundle-branch block Compared to ECG 2019 06:18:37
Sinus rhythm no longer present
 
Electronically Signed On 2019 11:01:43 CDT by Obie Daniels
[email or web address]
 
 
 
 
 
 
 
 
 
 
 
 
 
 
 
 
 
 
  <ELECTRONICALLY SIGNED>
                                           By: Obie Daniels MD, [facility]   
  19     1101
D: 19 0501   _____________________________________
T: 19 0501   Obie Daniels MD, [facility]     /EPI

## 2019-07-09 NOTE — EKG
[place] [address]
Phone:  [phone]                     ELECTROCARDIOGRAM REPORT      
_______________________________________________________________________________
 
Name:       DADNRE SCHULTZ                  Room:           [address]    ADM IN  
.R.#:  [id number]      Account #:      [id number]  
Admission:  19     Attend Phys:    Dandre Burns MD 
Discharge:               Date of Birth:  [birth year]  
         Report #: [id number]
    [id number]
_______________________________________________________________________________
THIS REPORT FOR:  //name//                      
 
                         [facility] ED
                                       
Test Date:    2019               Test Time:    19:26:05
Pat Name:     DANDRE SCHULTZ              Department:   
Patient ID:   [id number]            Room:         [facility]
Gender:       M                        Technician:   FL
:          1955               Requested By: Enrique West
Order Number: [id number] MD:   Obie Daniels
                                 Measurements
Intervals                              Axis          
Rate:         98                       P:            83
PA:           191                      QRS:          -35
QRSD:         137                      T:            92
QT:           395                                    
QTc:          505                                    
                           Interpretive Statements
Sinus rhythm
Multiple ventricular premature complexes
Left bundle-branch block
Compared to ECG 2019 06:18:37
No significant changes noted
Electronically Signed On 2019 10:59:39 CDT by Obie Daniels
[email or web address]
 
 
 
 
 
 
 
 
 
 
 
 
 
 
 
 
 
 
  <ELECTRONICALLY SIGNED>
                                           By: Obie Daniels MD, [facility]   
  19     1059
D: 19 1926   _____________________________________
T: 19   Obie Daniels MD, [facility]     /EPI

## 2019-07-09 NOTE — NUR
DISCHARGE PLANNER INFORMED OF THE NEED TO SEND A REFERRAL TO [facility]. D/C VASU ATTEMPTED TO CONTACT ADMISSIONS AT [facility]
AND LEFT A MESSAGE FOR JODI TO RETURN CALL TO DISCUSS ABILITY TO ACCEPT THE
PATIENT. D/C PLANNER FAXED PATIENT'S FACESHEET, AND CLINICAL INFO TO JODI.
NO PT/OT NOTES AVAILABLE TO SEND AT THIS TIME. CM WILL REMAIN AVAILABLE TO
ASSIST AND FOLLOW AS NEEDED.

## 2019-07-09 NOTE — NUR
PT TO FLOOR APPROX 2220 AND ASSUMED CARE, ASSESSMENT COMPLETED AS CHARTED,
APPROX 0430 PT COMPLAINED OF UNCONTROLLABLE CHEST PAIN, EKG DONE AND TROP
DRAW, NEW ORDERS RECIEVED. PARTIAL RELIEF NOTED WITH PT. SEE MAR. SEE
CHARTING. FALL PRECAUTIONS IN PLACE. HOURLY ROUNDING FOR SAFETY.

## 2019-07-09 NOTE — NUR
ASSUMED PT CARE AT 0800, AOX4, IMPULSIVE. O2 SAT 90'S 2L NC. TRACING ST,BBB ON
TELE. PT COMPLAINS OF CHEST PAIN. MEDS GIVEN. PT BLOOD SUGAR CRITICAL, INSULIN
GIVEN, FOR ACCU CHECK. PT NPO FOR HEART CATH. PT HAS WOUND ON L TOES, WOUND
NURSE CONSULTED. C/D/I. VSS, AM ASSESSMENT AS CHARTED. HOURLY ROUNDING. CALL
LIGHT WITHIN REACH. WILL CONTINUE TO MONITOR.

## 2019-07-09 NOTE — CARD
[address]  [address]                    CARDIAC CATH REPORT           
_______________________________________________________________________________
 
Name:       DANDRE SCHULTZ                  Room:           [address] [place]  
.R.#:  [id number]      Account #:      [id number]  
Admission:  07/08/19     Attend Phys:    Dandre Burns MD 
Discharge:               Date of Birth:  [birth year]  
         Report #: [id number]
                                                                     [id number]
_______________________________________________________________________________
THIS REPORT FOR:  //name//                      
 
 
--------------- APPROVED REPORT --------------
 
 
Study performed:  07/09/2019 14:34:34
 
Patient Details
The patient is a 63 year-old male
 
Event Personnel
Obie Daniels  Cardiologist, Fidelina Mccollum RN RN, Alyssa Sidhu RN RN, Ari Lee (WILY) Paula Patterson Brittany 
RN Monitor, Rachel Bernabe RTR Monitor
 
Procedures Performed
Art Access - R femoral artery*
 
Procedure Narrative
The patient was brought electively to the Cardiac Catheterization 
Laboratory and was prepped and draped in a sterile manner. A 6fr 
Ultimum Sheath sheath was inserted into the . Coronary angiography 
was performed using coronary diagnostic catheters. The right coronary 
system was accessed and visualized with a JR 4 6fr. catheter. The 
left coronary system was accessed and visualized with a JL 4 6fr. 
catheter. The left ventricle was accessed and visualized with a PC: 
Angled Pig 6fr catheter. The patient tolerated the procedure well and 
there were no complications associated with the procedure. 
 
Intraoperative Conscious Sedation
Sedation start time:  1536           Case end Time:  
1545    
      Versed  2 mg  
 
Dose:        827.09 mGy  
Contrast Type and Amount:  Omnipaque 125 ml    
 
Coronary Angiography
The patient's coronary anatomy is right dominant. 
 
Diagnostic Cath
Left Main The left main is short and normal and trifurcates into an 
LAD, intermediate ramus and circumflex coronary arteries.
LAD  The left anterior descending coronary artery is 50% narrowed 
proximally and 40% narrowed in the midportion. The distal vessel is 
 
 
 
[place] [address]                    CARDIAC CATH REPORT           
_______________________________________________________________________________
 
Name:       DANDRE SCHULTZ                  Room:           [address] [place]  
.R.#:  [id number]      Account #:      [id number]  
Admission:  07/08/19     Attend Phys:    Dandre Burns MD 
Discharge:               Date of Birth:  [birth year]  
         Report #: [id number]
                                                                     [id number]
_______________________________________________________________________________
free of significant disease.
Diagonal 1 The first diagonal branch is a large branched vessel with 
20% plaquing proximally.
Diagonal 2 The second diagonal branch is small in caliber and 
normal.
Circumflex The circumflex coronary artery has widely patent stents 
from the proximal to midportion. Distally the circumflex is 40% 
narrowed.
OM1  A fairly distal first obtuse marginal branch is normal.
OM2  The more distal second obtuse marginal branch was normal.
Right Coronary The right coronary artery has a widely patent stent 
proximally. The mid and distal vessel are free of significant 
disease.
R PDA  The PDA is free of significant disease.
RPLV  A moderate size branch and PDA is free of significant 
disease.
Ramus  The ramus intermedius has a widely patent stents in the ostial 
and proximal portion. The mid and distal vessel is free of 
significant disease. There is proximal 10% narrowing distally. A 
small branch of the radius has a 90% ostial stenosis. The ramus has 
circumflex/obtuse marginal distribution.
 
Hemodynamics
The aortic pressure is 118/57 mmHg with a mean of 83 mmHg. The left 
ventricular pressure is 115/15 mmHg with a mean of mmHg. The left 
ventricular end diastolic pressure is 33 mmHg. 
 
Conclusion
1. Three-vessel coronary artery disease as outlined above.
2. Widely patent stents in the ostial to proximal ramus, ostial to 
mid circumflex and proximal right coronary arteries.
3. 50% proximal and 40% mid LAD narrowing.
4. 40% distal circumflex narrowing.
5. Elevated left ventricular end-diastolic pressure consistent with 
acute diastolic heart failure.
6. Normal left ventricular systolic function. 
 
Recommendations
1. Continue medical management and aggressive risk factor 
modification.
2. Continue treatment for diastolic heart failure.
 
 
 
<ELECTRONICALLY SIGNED>
                                        By:  Obie Daniels MD, FACC   
07/09/19     1644
D: 07/09/19 1644_______________________________________
T: 07/09/19 1644Micclaudette Daniels MD, FACC      /INF

## 2019-07-09 NOTE — NUR
WOUND CARE NOTE: PT WAS SEEN TO ADDRESS WOUNDS TO LEFT FOOT.
PT CURRENTLY BEING SEEN FOR CHEST PAIN. HE IS TO HAVE A CARDIAC CATH LATER
TODAY. HE HAS A HX OF DIABETES, VASULAR DISEASE, PERIPHERAL NEUROPATHY, PNA.
PT HAD BLOOD SUGARS >500 THIS AM AND IS BEING TREATED WITH INSULIN.
 
PT HAS MULTIPLE DFU'S TO THE LEFT FOOT. ON THE OUTER PART OF THE FOOT THERE IS
A FULL THICKNESS WOUND THAT MEASURES 1.7X1.1X0.2CM. THERE IS DRIED SLOUGH
COVERING THE ENTIRE WOUND BED. THE ERICK WOUND IS DRY AND THE EDGES ARE WELL
APPROXIMATED. THE 2ND TOE WOUND MEASURES 104X0.6X0.0CM. THERE IS DRIED SLOUGH
AND SCABBING PRESENT. THE ERICK WOUND IS SLIGHTLY RED AND WARM TO THE TOUCH.
THE 4TH TOE WOUND MEASURES 1.1X0.9X0.0 WITH DRIED SLOUGH AND THE ERICK WOUND IS
SLIGHTLY RED AND WARM TO THE TOUCH. THE 5TH TOE  WOUND MEASURES 1.1X1.0X0.0
WITH LARGE AMOUNT OF DRIED SLOUGH MATERIAL. THERE IS NO DRAINAGE NOTED. THE
FOOT IS SLIGHTY SWOLLEN. THERE ARE 2 AREAS TO THE HEEL THAT ARE CALLUSED
POSSIBLE FROM A HEALED DFU.BILAT LEGS HAVE MULTIPLE HEALING SCABS. THE PT
STATES HE SITS OUTSIDE A LOT. AND THEN HE SCRATCHES AT HIS LEGS CAUSING SCABS.
THESE DO APPEAR TO BE BUG BITES.  THE PT DID C/O PAIN TO THE TOUCH. HE STATES
HE WAS GOING TO [facility] UNTIL ABOUT 4 OR MORE MONTHS AGO. PT HAS NOT HAD
ANY WC AT HOME FOR THE FOOT. HE STATES HE WENT TO [facility] WHERE HIS
PODIATRIST IS AND HAD AN MRI. HE STATES IT SHOWED "INFECTION IN THE BONE" AND
THAT HIS DRS TOLD HIM HE NEEDED IT "AMPUTATED". I ASKED THE PT IF HE WOULD BE
WILLING TO HAVE OUR PODITRIST LOOK AT IT WHILE HE IS HERE AND HE WAS WILLING
TO DO SO. DR PIERCE NOTIFIED OF MY FINDINGS AND THE DISCUSSION WITH THE PT. NO
CONSULT PLACED AT THIS TIME.
 
ALL WOUNDS TO LEFT FOOT WASHED WITH WOUND CLEANSER, PATTED DRY. IN THE OUTER
ASPECT OF THE FOOT AQUACEL AG PLACED WITH BOARDERED FOAM COVERING IT. THE 2ND,
4TH AND 5TH TOES COVERED WITH POTIFOAM AG. THE FOOT WAS COVERED WITH KERLIX
AND ACE BANDAGE. BILAT LEGS WASHED WITH WOUND CLEANSER AND BARRIOR CREAM
APPLIED TO BOTH. PT TOLEREATED THIS WELL WITH SMALL AMOUNT OF PAIN TO THE LEFT
FOOT.
 
PT EDUCATED TO KEEP LEFT FOOT ELEVATED ON A PILLOW. PT VERBALIZED
UNDERSTANDING.

## 2019-07-09 NOTE — NUR
PT HAD HEART CATH TODAY. R GROIN D/C/I. PT DENIES PAIN AT THIS TIME. PT FOR NM
LUNG SCAN VQ, US BILATERAL LOWER EXTREMITY,  FOOT XRAY AND CHEST XRAY. PT O2
DROP TO 80'S WHEN SLEEP. O2 ADJUST TO 3L. PT IMPULSIVE. ANTIBIOTIC STARTED. L
FOOT WOUND C/D/I. BED ALARM. CALL LIGHT WITHIN REACH. WILL CONTINUE TO
MONITOR.

## 2019-07-09 NOTE — NUR
Pt is A&O but forgetful. Resides at home alone, states that he does not have a
a support sx. Pt states that he has been struggling at home, unable to
complete ADLs and IADLs. Pt wants to dc to a LTC facility, Pt states that he
would like to dc to [facility], Pt states that he has been there before. Pt
has a walker and cane at home. No home o2. DC planner to contact [facility]
to determine ability to accept. Pt did not want to discuss an alternate LTC,
to be contacted if [facility] cannot accept. CM following.

## 2019-07-10 VITALS — SYSTOLIC BLOOD PRESSURE: 110 MMHG | DIASTOLIC BLOOD PRESSURE: 63 MMHG

## 2019-07-10 VITALS — DIASTOLIC BLOOD PRESSURE: 60 MMHG | SYSTOLIC BLOOD PRESSURE: 100 MMHG

## 2019-07-10 VITALS — SYSTOLIC BLOOD PRESSURE: 87 MMHG | DIASTOLIC BLOOD PRESSURE: 53 MMHG

## 2019-07-10 VITALS — DIASTOLIC BLOOD PRESSURE: 64 MMHG | SYSTOLIC BLOOD PRESSURE: 109 MMHG

## 2019-07-10 VITALS — DIASTOLIC BLOOD PRESSURE: 66 MMHG | SYSTOLIC BLOOD PRESSURE: 109 MMHG

## 2019-07-10 LAB
%HYPO/RBC NFR BLD AUTO: (no result) %
ABSOLUTE BASOPHILS: 0.1 THOU/UL (ref 0–0.2)
ABSOLUTE EOSINOPHILS: 0 THOU/UL (ref 0–0.7)
ABSOLUTE MONOCYTES: 0.6 THOU/UL (ref 0–1.2)
ANION GAP SERPL CALC-SCNC: 8 MMOL/L (ref 7–16)
BASOPHILS NFR BLD AUTO: 0.5 %
BUN SERPL-MCNC: 19 MG/DL (ref 7–18)
CALCIUM SERPL-MCNC: 8.4 MG/DL (ref 8.5–10.1)
CHLORIDE SERPL-SCNC: 103 MMOL/L (ref 98–107)
CO2 SERPL-SCNC: 30 MMOL/L (ref 21–32)
CREAT SERPL-MCNC: 0.8 MG/DL (ref 0.6–1.3)
EOSINOPHIL NFR BLD: 0 %
EST. AVERAGE GLUCOSE BLD GHB EST-MCNC: 390 MG/DL
GLUCOSE SERPL-MCNC: 305 MG/DL (ref 70–99)
GLYCOHEMOGLOBIN (HGB A1C): 15.2 % (ref 4.8–5.6)
GRANULOCYTES NFR BLD MANUAL: 75.6 %
HCT VFR BLD CALC: 26.5 % (ref 42–52)
HGB BLD-MCNC: 8.1 GM/DL (ref 14–18)
IRON SERPL-MCNC: 15 UG/DL (ref 50–175)
LYMPHOCYTES # BLD: 2 THOU/UL (ref 0.8–5.3)
LYMPHOCYTES NFR BLD AUTO: 18.3 %
MAGNESIUM SERPL-MCNC: 1.9 MG/DL (ref 1.8–2.4)
MCH RBC QN AUTO: 20.8 PG (ref 26–34)
MCHC RBC AUTO-ENTMCNC: 30.7 G/DL (ref 28–37)
MCV RBC: 67.8 FL (ref 80–100)
MONOCYTES NFR BLD: 5.6 %
MPV: 7.9 FL. (ref 7.2–11.1)
NEUTROPHILS # BLD: 8.2 THOU/UL (ref 1.6–8.1)
NUCLEATED RBCS: 0 /100WBC
OVALOCYTES BLD QL SMEAR: (no result)
PLATELET # BLD EST: ADEQUATE 10*3/UL
PLATELET COUNT*: 384 THOU/UL (ref 150–400)
POTASSIUM SERPL-SCNC: 4.6 MMOL/L (ref 3.5–5.1)
RBC # BLD AUTO: 3.91 MIL/UL (ref 4.5–6)
RDW-CV: 19.1 % (ref 10.5–14.5)
SAO2 % BLD FROM PO2: 4 % (ref 20–39)
SODIUM SERPL-SCNC: 141 MMOL/L (ref 136–145)
TARGETS BLD QL SMEAR: (no result)
WBC # BLD AUTO: 10.9 THOU/UL (ref 4–11)

## 2019-07-10 NOTE — NUR
PT IS ABLE TO COMMUNICATE HIS NEEDS TO STAFF WITH VERY MINOR DIFFICULTY; HE IS
HARD-OF-HEARING. PT CAN BE IMPULSIVE FUSSY AND IRRITABLE AT TIMES. HE HAS
DENIED THE NEED FOR PAIN MEDICATION UP TO THIS TIME. RIGHT GROIN CATH SITE
DRESSING IS C/D/I UP TO THIS TIME. BILATERAL LE ULTRASOUND TENTATIVELY
SCHEDULED FOR TODAY. PODIATRY CONSULTED.

## 2019-07-10 NOTE — NUR
Nutrition: Pt admitted with chest pain. Has wounds on Lt foot, to be
amputated, per pt. He stated a lot of pain in  foot. Seen for pressure ulcer
risk. Wt: 148#. He stated his wt flustuates 140-170s and he doesn't know why.
CHO controlled diet. , albumin 2.8. Pt agreed to Klaus orange for wound
healing. RD ordered it. Consider mild risk at this time. He is eating well.

## 2019-07-10 NOTE — NUR
PT UP IN ROOM WITH STEADY GAIT. ENCOURAGED PT TO AVOID WALKING ON FOOT
UNLESS NECESSARY. PT TOLERATING PO WELL. IVF INFUSING. PAIN CONTROLLED
WITH MEDS. PODIATRY AND VASCULAR CONSULT TODAY. NSR ON MONITOR THROUGHOUT
SHIFT

## 2019-07-10 NOTE — NUR
DISCHARGE PLANER RECIEVED A CALL FROM JODI WITH [facility] AND SHE
INFORMS THAT THE FACILITY HAS DECIDED TO DECLINE ACCEPTANCE OF THE PATIENT DUE
TO THE CIRCUMSTANCES THAT HE HAD LEFT THE FACILITY IN THE PAST. PER JODI
'THE PATIENT HAD ONLY STATED UNIL HE HAD RECEIVED HIS SCRIPT FOR OXYCODONE,
AND LEFT THE FACILITY ATER THAT. THE PATIENT HAD ALSO INITIALLY ACTED AS IF HE
COULD NOT WALK, BUT WALKED OUT OF THE BUILDING WITHOUT ANY ASSISTANCE THE DAY
HE LEFT'. D/C PLANNER WILL F/U WITH THE PATIENT TO DISCUSS THIS AND OTHER LTC
OPTIONS. CM WILL REMAIN AVAILABLE TO ASSIST AND FOLLOW AS NEEDED.

## 2019-07-11 VITALS — SYSTOLIC BLOOD PRESSURE: 54 MMHG | DIASTOLIC BLOOD PRESSURE: 33 MMHG

## 2019-07-11 VITALS — SYSTOLIC BLOOD PRESSURE: 93 MMHG | DIASTOLIC BLOOD PRESSURE: 62 MMHG

## 2019-07-11 VITALS — SYSTOLIC BLOOD PRESSURE: 91 MMHG | DIASTOLIC BLOOD PRESSURE: 58 MMHG

## 2019-07-11 VITALS — SYSTOLIC BLOOD PRESSURE: 126 MMHG | DIASTOLIC BLOOD PRESSURE: 68 MMHG

## 2019-07-11 VITALS — DIASTOLIC BLOOD PRESSURE: 40 MMHG | SYSTOLIC BLOOD PRESSURE: 97 MMHG

## 2019-07-11 VITALS — DIASTOLIC BLOOD PRESSURE: 72 MMHG | SYSTOLIC BLOOD PRESSURE: 134 MMHG

## 2019-07-11 VITALS — DIASTOLIC BLOOD PRESSURE: 35 MMHG | SYSTOLIC BLOOD PRESSURE: 90 MMHG

## 2019-07-11 VITALS — DIASTOLIC BLOOD PRESSURE: 69 MMHG | SYSTOLIC BLOOD PRESSURE: 110 MMHG

## 2019-07-11 VITALS — SYSTOLIC BLOOD PRESSURE: 102 MMHG | DIASTOLIC BLOOD PRESSURE: 44 MMHG

## 2019-07-11 VITALS — SYSTOLIC BLOOD PRESSURE: 130 MMHG | DIASTOLIC BLOOD PRESSURE: 77 MMHG

## 2019-07-11 VITALS — SYSTOLIC BLOOD PRESSURE: 108 MMHG | DIASTOLIC BLOOD PRESSURE: 55 MMHG

## 2019-07-11 VITALS — SYSTOLIC BLOOD PRESSURE: 138 MMHG | DIASTOLIC BLOOD PRESSURE: 77 MMHG

## 2019-07-11 VITALS — SYSTOLIC BLOOD PRESSURE: 122 MMHG | DIASTOLIC BLOOD PRESSURE: 65 MMHG

## 2019-07-11 VITALS — DIASTOLIC BLOOD PRESSURE: 69 MMHG | SYSTOLIC BLOOD PRESSURE: 135 MMHG

## 2019-07-11 VITALS — SYSTOLIC BLOOD PRESSURE: 135 MMHG | DIASTOLIC BLOOD PRESSURE: 73 MMHG

## 2019-07-11 VITALS — DIASTOLIC BLOOD PRESSURE: 63 MMHG | SYSTOLIC BLOOD PRESSURE: 127 MMHG

## 2019-07-11 VITALS — SYSTOLIC BLOOD PRESSURE: 96 MMHG | DIASTOLIC BLOOD PRESSURE: 52 MMHG

## 2019-07-11 VITALS — DIASTOLIC BLOOD PRESSURE: 74 MMHG | SYSTOLIC BLOOD PRESSURE: 129 MMHG

## 2019-07-11 VITALS — DIASTOLIC BLOOD PRESSURE: 56 MMHG | SYSTOLIC BLOOD PRESSURE: 120 MMHG

## 2019-07-11 VITALS — DIASTOLIC BLOOD PRESSURE: 59 MMHG | SYSTOLIC BLOOD PRESSURE: 110 MMHG

## 2019-07-11 VITALS — DIASTOLIC BLOOD PRESSURE: 31 MMHG | SYSTOLIC BLOOD PRESSURE: 97 MMHG

## 2019-07-11 VITALS — DIASTOLIC BLOOD PRESSURE: 70 MMHG | SYSTOLIC BLOOD PRESSURE: 114 MMHG

## 2019-07-11 VITALS — SYSTOLIC BLOOD PRESSURE: 116 MMHG | DIASTOLIC BLOOD PRESSURE: 60 MMHG

## 2019-07-11 VITALS — SYSTOLIC BLOOD PRESSURE: 95 MMHG | DIASTOLIC BLOOD PRESSURE: 44 MMHG

## 2019-07-11 LAB
%HYPO/RBC NFR BLD AUTO: (no result) %
ABSOLUTE BASOPHILS: 0.1 THOU/UL (ref 0–0.2)
ABSOLUTE EOSINOPHILS: 0.2 THOU/UL (ref 0–0.7)
ABSOLUTE MONOCYTES: 0.4 THOU/UL (ref 0–1.2)
ANION GAP SERPL CALC-SCNC: 7 MMOL/L (ref 7–16)
ANISOCYTOSIS BLD QL SMEAR: (no result)
BASOPHILS NFR BLD AUTO: 1.3 %
BE: -0.9 MMOL/L
BILIRUB UR-MCNC: NEGATIVE MG/DL
BUN SERPL-MCNC: 13 MG/DL (ref 7–18)
CALCIUM SERPL-MCNC: 8.4 MG/DL (ref 8.5–10.1)
CHLORIDE SERPL-SCNC: 103 MMOL/L (ref 98–107)
CO2 SERPL-SCNC: 28 MMOL/L (ref 21–32)
COLOR UR: YELLOW
CREAT SERPL-MCNC: 0.7 MG/DL (ref 0.6–1.3)
EOSINOPHIL NFR BLD: 2.8 %
GLUCOSE SERPL-MCNC: 220 MG/DL (ref 70–99)
GRANULOCYTES NFR BLD MANUAL: 59 %
HCT VFR BLD CALC: 26.2 % (ref 42–52)
HGB BLD-MCNC: 7.8 GM/DL (ref 14–18)
KETONES UR STRIP-MCNC: NEGATIVE MG/DL
LYMPHOCYTES # BLD: 2.8 THOU/UL (ref 0.8–5.3)
LYMPHOCYTES NFR BLD AUTO: 32.1 %
MCH RBC QN AUTO: 20 PG (ref 26–34)
MCHC RBC AUTO-ENTMCNC: 29.7 G/DL (ref 28–37)
MCV RBC: 67.4 FL (ref 80–100)
MICROCYTES: (no result)
MONOCYTES NFR BLD: 4.8 %
MPV: 7.9 FL. (ref 7.2–11.1)
NEUTROPHILS # BLD: 5.1 THOU/UL (ref 1.6–8.1)
NUCLEATED RBCS: 0 /100WBC
OVALOCYTES BLD QL SMEAR: (no result)
PCO2 BLD: 48.4 MMHG (ref 35–45)
PLATELET # BLD EST: ADEQUATE 10*3/UL
PLATELET COUNT*: 361 THOU/UL (ref 150–400)
PO2 BLD: 68.9 MMHG (ref 75–100)
POIKILOCYTOSIS BLD QL SMEAR: (no result)
POLYCHROMASIA BLD QL SMEAR: (no result)
POTASSIUM SERPL-SCNC: 4.6 MMOL/L (ref 3.5–5.1)
PROT UR QL STRIP: NEGATIVE
RBC # BLD AUTO: 3.89 MIL/UL (ref 4.5–6)
RBC # UR STRIP: (no result) /UL
RDW-CV: 18.9 % (ref 10.5–14.5)
SODIUM SERPL-SCNC: 138 MMOL/L (ref 136–145)
SP GR UR STRIP: 1.01 (ref 1–1.03)
URINE CLARITY: CLEAR
URINE GLUCOSE-RANDOM: (no result)
URINE LEUKOCYTES-REFLEX: NEGATIVE
URINE NITRITE-REFLEX: NEGATIVE
UROBILINOGEN UR STRIP-ACNC: 0.2 E.U./DL (ref 0.2–1)
WBC # BLD AUTO: 8.6 THOU/UL (ref 4–11)

## 2019-07-11 PROCEDURE — B548ZZA ULTRASONOGRAPHY OF SUPERIOR VENA CAVA, GUIDANCE: ICD-10-PCS | Performed by: INTERNAL MEDICINE

## 2019-07-11 PROCEDURE — 02HV33Z INSERTION OF INFUSION DEVICE INTO SUPERIOR VENA CAVA, PERCUTANEOUS APPROACH: ICD-10-PCS | Performed by: INTERNAL MEDICINE

## 2019-07-11 NOTE — CON
[address]  [address]                    CONSULTATION                  
_______________________________________________________________________________
 
Name:       DANDRE SCHULTZ                  Room:           [address]    ADM [place]  
M.R.#:  [id number]      Account #:      [id number]  
Admission:  07/08/19     Attend Phys:    Dandre Burns MD 
Discharge:               Date of Birth:  [birth year]  
         Report #: [id number]
                                                                     [id number]  
_______________________________________________________________________________
THIS REPORT FOR:  //name//                      
 
CC: FAM physician/PCP
    Dandre Burns
 
DATE OF SERVICE:  07/10/2019
 
 
INFECTIOUS DISEASE CONSULTATION
 
ATTENDING PHYSICIAN:  Dandre Burns MD
 
REASON FOR EVALUATION:  Left great toe distal aspect chronic osteomyelitis.
 
HISTORY OF PRESENT ILLNESS:  Chart reviewed, patient examined.  This is a
63-year-old man with known history of diabetes mellitus type 2 for a number of
years, was admitted through the Emergency Room with fairly profound chest pain
and discomfort.  Does have vasculopathy, known coronary artery disease, previous
stenting, also COPD, was evaluated and found in part to have a chronic
ulceration involving the distal aspect of the left great toe, although he states
the pain involves the entirety of the foot onto the leg, has been undergoing
some degree of wound care.  At some point, I believe it is recommended
amputation due to peripheral vascular disease as well as a chronic nonhealing
wound.  It is not clear that he has had any fevers.  Generally, he is
uncomfortable.  Doppler of the lower extremity does show compromised perfusion
pending Vascular Surgery evaluation.  He was empirically started on
antimicrobials with vancomycin as well as ceftriaxone.
 
ALLERGIES:  Listed to CONTRAST DYE, PENICILLINS, NITROGLYCERIN, CODEINE,
FENTANYL, TRAMADOL AND TORADOL.
 
CURRENT MEDICATIONS:  Include atorvastatin, insulin, hydrocodone, multivitamin,
aspirin, clopidogrel, pantoprazole, vancomycin, tamsulosin, ascorbic acid,
metoprolol, sertraline, ceftriaxone, gabapentin, ipratropium and albuterol
inhaler, p.r.n. analgesics, antiemetics, anxiolytics.
 
PAST MEDICAL HISTORY:  As described above, diabetes mellitus type 2, insulin
requiring, has known vasculopathy including coronary artery disease, peripheral
vascular disease, previous history of noted abdominal aortic aneurysm.  He has
had acute MI, COPD, hyperlipidemia, hypertension.
 
SOCIAL HISTORY:  Nonsmoker, no ethanol, no illicit drug use.
 
FAMILY HISTORY:  Noncontributory.
 
REVIEW OF SYSTEMS:  Otherwise, unremarkable 10-point review of systems with the
 
 
 
[place] [address]                    CONSULTATION                  
_______________________________________________________________________________
 
Name:       DANDRE SCHULTZ                  Room:           [address] [place]  
[facility]#:  [id number]      Account #:      [id number]  
Admission:  07/08/19     Attend Phys:    Dandre Burns MD 
Discharge:               Date of Birth:  [birth year]  
         Report #: [id number]
                                                                     [id number]  
_______________________________________________________________________________
exception of the above.
 
PHYSICAL EXAMINATION:
GENERAL:  He is quite uncomfortable.  He is mildly agitated.  He appears
chronically ill, undernourished.
VITAL SIGNS:  Temperature 98.1, pulse 84, respirations 18, blood pressure
187/53.
SKIN:  Warm, dry, no rashes.
HEENT:  Normocephalic.  Extraocular muscles intact.
NECK:  Supple.
LUNGS:  Diminished breath sounds.
HEART:  Regular, some ectopy, has a soft systolic murmur.
ABDOMEN:  Mildly tender, slightly distended.  There is no organomegaly.  No
peritoneal signs.
EXTREMITIES:  Left lower extremity has a dressing over his ____ covering his
ankle onto his distal leg.
GENITOURINARY:  Deferred.
RECTAL:  Deferred.
 
LABORATORY DATA:  Blood cultures sterile thus far.  Arterial Doppler showed
diffuse mild atherosclerotic disease; right lower extremity, moderate
atherosclerotic disease within the distal superficial femoral artery,
approximately 50% stenosis; diffuse mild atherosclerotic disease of the left
lower extremity; moderate atherosclerotic disease of proximal left superficial
femoral roughly 50% stenosis.  CBC:  White count of 10.9, H and H 8.1 and 26.5,
platelets of 384.  Electrolytes:  Sodium 141, potassium 4.6, chloride 103,
bicarbonate is 30, anion gap of 8, BUN and creatinine 19 and 0.8.  Plain film of
the foot shows findings suggestive of osteomyelitis involving the first distal
phalanx.  Lactic acid of 1.9.
 
ASSESSMENT:  Chronic osteomyelitis involving the distal left great toe.  At this
point, we will continue empiric therapy and Vascular Surgery to evaluate.  It is
unlikely with antibiotics alone that we would correct this.  Await their
decision to see if it is reasonable to expect healing with minimal intervention
or would need more drastic measures, consider hyperbaric oxygen as well.
 
 
 
 
 
 
 
 
 
<ELECTRONICALLY SIGNED>
                                        By:  Jack Cowan MD           
07/11/19     1257
D: 07/10/19 1509_______________________________________
T: 07/11/19 0100Jack Cowan MD              /nt

## 2019-07-11 NOTE — EKG
[place] [address]
Phone:  [phone]                     ELECTROCARDIOGRAM REPORT      
_______________________________________________________________________________
 
Name:       DANDRE SCHULTZ                  Room:           [address]    ADM IN  
.R.#:  [id number]      Account #:      [id number]  
Admission:  19     Attend Phys:    Dandre Burns MD 
Discharge:               Date of Birth:  [birth year]  
         Report #: [id number]
    [id number]
_______________________________________________________________________________
THIS REPORT FOR:  //name//                      
 
                          [facility]
                                       
Test Date:    2019               Test Time:    09:04:13
Pat Name:     DANDRE SCHULTZ              Department:   
Patient ID:   [id number]            Room:         [facility]
Gender:       M                        Technician:   
:          1955               Requested By: Julia Dye
Order Number: [id number]    Wai MD:   Obie Daniels
                                 Measurements
Intervals                              Axis          
Rate:         83                       P:            91
RI:           172                      QRS:          -16
QRSD:         136                      T:            131
QT:           417                                    
QTc:          490                                    
                           Interpretive Statements
Sinus rhythm
Ventricular premature complex
IVCD, consider atypical LBBB
Compared to ECG 2019 05:01:36
Sinus tachycardia no longer present
 
Electronically Signed On 2019 17:03:28 CDT by Obie Daniels
[email or web address]
 
 
 
 
 
 
 
 
 
 
 
 
 
 
 
 
 
  <ELECTRONICALLY SIGNED>
                                           By: Obie Daniels MD, [facility]   
  19     1703
D: 19 0904   _____________________________________
T: 19 0904   Obie Daniels MD, [facility]     /EPI

## 2019-07-11 NOTE — NUR
PT RECEIVED IN ICU AT 1330, O2 SATS >92% ON HFC 6L/MIN. DOPAMINE STARTED AT
5 MCG/KG/MIN FOR LOW BP. CT HEAD AND ECHO DONE. PLANNED FOR CAROTID
ENDARTERECTOMY TOMORROW, INFORMED CONSENT SIGNED. % OF HIS MEALS,
TO BE KEPT NPO AFTER MIDNIGHT. GOOD URINE OUTPUT. HYDROCODONE ADMINISTERED
ONCE FOR PAIN ON THE LEFT FOOT.

## 2019-07-11 NOTE — 2DMMODE
[place] [address]
Phone:  [phone] 2 D/M-MODE ECHOCARDIOGRAM     
_______________________________________________________________________________
 
Name:         DANDRE SCHULTZ                 Room:          [facility] [place] 
[facility]#:    [id number]     Account #:     [id number]  
Admission:    19    Attend Phys:   Dandre Burns, 
Discharge:                Date of Birth: [birth year]  
Date of Service: 19 1615  Report #:      [id number]
        [id number]
_______________________________________________________________________________
THIS REPORT FOR:  //name//                      
 
 
--------------- APPROVED REPORT --------------
 
 
Study performed:  2019 14:37:11
 
EXAM: Comprehensive 2D, Doppler, and color-flow 
Echocardiogram 
Patient Location: In-Patient   
Room #:  003     Status:  routine
 
     BSA:         1.89
HR: 80 bpm BP:          97/31 mmHg 
Rhythm: NSR    
 
Other Information 
Study Quality: Good
 
Indications
Dyspnea 
Chest Pain
 
2D Dimensions
IVSd:  10.52 (7-11mm) LVOT Diam:  21.51 (18-24mm) 
LVDd:  44.99 mm  
PWd:  8.37 (7-11mm) 
LVDs:  33.44 (25-40mm) 
Aortic Root:  37.89 mm 
 
Volumes
Left Atrial Volume (Systole) 
    LA ESV Index:  23.90 mL/m2
 
Aortic Valve
AoV Peak Frank.:  0.97 m/s 
AO Peak Gr.:  3.76 mmHg  LVOT Max PG:  3.58 mmHg
AO Mean Gr.:  2.16 mmHg  LVOT Mean P.61 mmHg
    LVOT Max V:  0.95 m/s
AO V2 VTI:  18.89 cm  LVOT Mean V:  0.57 m/s
RICCI (VTI):  3.75 cm2  LVOT V1 VTI:  19.47 cm
 
Mitral Valve
    E/A Ratio:  1.08
    MV Decel. Time:  223.11 ms
 
 
[place] [address]
Phone:  [phone]                     2 D/M-MODE ECHOCARDIOGRAM     
_______________________________________________________________________________
 
Name:         DANDRE SCHULTZ                 Room:          [address] [place] 
M.R.#:    [id number]     Account #:     [id number]  
Admission:    19    Attend Phys:   Dandre Burns, 
Discharge:                Date of Birth: [birth year]  
Date of Service: 19 1615  Report #:      [id number]
        [id number]
_______________________________________________________________________________
MV E Max Frank.:  1.20 m/s 
MV PHT:  64.70 ms  
MVA (PHT):  3.40 cm2  
 
TDI
E/Lateral E':  10.00 E/Medial E':  12.00
   Medial E' Frank.:  0.10 m/s
   Lateral E' Frank.:  0.12 m/s
 
Pulmonary Valve
PV Peak Frank.:  0.71 m/s PV Peak Gr.:  2.01 mmHg
 
Left Ventricle
The left ventricle is normal size. There is normal LV segmental wall 
motion. There is normal left ventricular wall thickness. Left 
ventricular systolic function is normal. LVEF is 55-60%. Moderate 
diastolic dysfunction is present (pseudonormal filling).
 
Right Ventricle
The right ventricle is normal size. The right ventricular systolic 
function is normal.
 
Atria
The left atrium size is normal. The right atrium size is 
normal.
 
Aortic Valve
The aortic valve is normal in structure. No aortic regurgitation is 
present. There is no aortic valvular stenosis.
 
Mitral Valve
The mitral valve is normal in structure. There is no mitral valve 
regurgitation noted. No evidence of mitral valve stenosis.
 
Tricuspid Valve
The tricuspid valve is normal in structure. There is no tricuspid 
valve regurgitation noted.
 
Pulmonic Valve
The pulmonary valve is normal in structure. There is no pulmonic 
valvular regurgitation.
 
Great Vessels
The aortic root is normal in size. IVC is normal in size and 
collapses >50% with inspiration.
 
 
 
[place] [address]
Phone:  [phone]                     2 D/M-MODE ECHOCARDIOGRAM     
_______________________________________________________________________________
 
Name:         DANDRE SCHULTZ                 Room:          [address] [place] 
[facility]#:    [id number]     Account #:     [id number]  
Admission:    19    Attend Phys:   Dandre Burns, 
Discharge:                Date of Birth: [birth year]  
Date of Service: 19 1615  Report #:      [id number]
        [id number]
_______________________________________________________________________________
Pericardium
There is no pericardial effusion.
 
<Conclusion>
The left ventricle is normal size.
There is normal left ventricular wall thickness.
Left ventricular systolic function is normal.
LVEF is 55-60%.
Moderate diastolic dysfunction is present (pseudonormal filling).
IVC is normal in size and collapses >50% with inspiration.
 
 
 
 
 
 
 
 
 
 
 
 
 
 
 
 
 
 
 
 
 
 
 
 
 
 
 
 
 
 
 
 
 
 
  <ELECTRONICALLY SIGNED>
                                           By: Obie Daniels MD, FACC   
  19     1615
D: 19   _____________________________________
T: 19   Obie Daniels MD, FACC     /INF

## 2019-07-11 NOTE — NUR
TRANSFER NOTE - THIS AM, PT FOUND TO HAVE O2 SAT OF 67% ON RA.  TITRATED O2 UP
TO 10L TO MAINTAIN O2 SAT ABOVE 90%.  PT DENIES ANY SOA.  OBTAIN BP AROUND
1150AM AND BP WAS 92/30.  SPOKE WITH .  TRANSFER TO ICU.  REPORT
GIVEN TO CRISTINA ALCAZAR.  ALL BELONGINGS SENT WITH PT.

## 2019-07-11 NOTE — NUR
CONSULTED TO PLACE URGENT PICC FRO NEED FOR PRESSORS AND MULTIPLE INFUSIONS.
CHART EVALUALTED AND CONCENT NOTED. SPOKE WITH PT RISK AND BENIFIT REVIEWED.
VOICED UNDERSTANDING AND AGREED. RIGHT UPPER ARM ASSESSED WITH ULTRASOUND.
RIGHT BASILIC IDENTIFIED AND NOTED TO BE WIDLEY PATENT. TRIPLE LUMAN POWER
PICC PLACED PER PROTOCOL. INCLUDING ULTRASOUND GUIDANCE, MAX BARRIER
PRECAUTIONS AND SHERLOCK 3CG TECHNOLOGY. GOOD BRISK BLOOD RETURN NOTED AND
FLUSHED WITH EASE. UNABLE TO PRINT SHERSAVORTEX EKG STAT X-RAY ORDERED. X-RAY
SHOWS TIP AT [facility] AND GOOD PLACEMENT. LINE RELEASED FOR USE TO OTTONIEL COX RN.
TOLERATED WELL.

## 2019-07-12 VITALS — SYSTOLIC BLOOD PRESSURE: 122 MMHG | DIASTOLIC BLOOD PRESSURE: 62 MMHG

## 2019-07-12 VITALS — DIASTOLIC BLOOD PRESSURE: 56 MMHG | SYSTOLIC BLOOD PRESSURE: 123 MMHG

## 2019-07-12 VITALS — SYSTOLIC BLOOD PRESSURE: 117 MMHG | DIASTOLIC BLOOD PRESSURE: 62 MMHG

## 2019-07-12 VITALS — SYSTOLIC BLOOD PRESSURE: 128 MMHG | DIASTOLIC BLOOD PRESSURE: 67 MMHG

## 2019-07-12 VITALS — SYSTOLIC BLOOD PRESSURE: 126 MMHG | DIASTOLIC BLOOD PRESSURE: 72 MMHG

## 2019-07-12 VITALS — DIASTOLIC BLOOD PRESSURE: 59 MMHG | SYSTOLIC BLOOD PRESSURE: 119 MMHG

## 2019-07-12 VITALS — DIASTOLIC BLOOD PRESSURE: 73 MMHG | SYSTOLIC BLOOD PRESSURE: 135 MMHG

## 2019-07-12 VITALS — SYSTOLIC BLOOD PRESSURE: 115 MMHG | DIASTOLIC BLOOD PRESSURE: 53 MMHG

## 2019-07-12 VITALS — DIASTOLIC BLOOD PRESSURE: 60 MMHG | SYSTOLIC BLOOD PRESSURE: 116 MMHG

## 2019-07-12 VITALS — DIASTOLIC BLOOD PRESSURE: 55 MMHG | SYSTOLIC BLOOD PRESSURE: 109 MMHG

## 2019-07-12 VITALS — DIASTOLIC BLOOD PRESSURE: 74 MMHG | SYSTOLIC BLOOD PRESSURE: 128 MMHG

## 2019-07-12 VITALS — DIASTOLIC BLOOD PRESSURE: 56 MMHG | SYSTOLIC BLOOD PRESSURE: 124 MMHG

## 2019-07-12 VITALS — DIASTOLIC BLOOD PRESSURE: 60 MMHG | SYSTOLIC BLOOD PRESSURE: 126 MMHG

## 2019-07-12 VITALS — DIASTOLIC BLOOD PRESSURE: 63 MMHG | SYSTOLIC BLOOD PRESSURE: 120 MMHG

## 2019-07-12 VITALS — SYSTOLIC BLOOD PRESSURE: 124 MMHG | DIASTOLIC BLOOD PRESSURE: 68 MMHG

## 2019-07-12 VITALS — SYSTOLIC BLOOD PRESSURE: 120 MMHG | DIASTOLIC BLOOD PRESSURE: 64 MMHG

## 2019-07-12 NOTE — NUR
PT WANTS TO LEAVE THE HOSPITAL, HOUSE SUPERVISOR AND PROVIDER NOTIFIED, OKAY
FOR HIM TO LEAVE AMA. PT SIGNED THE LEAVE AMA FORM AND HE CALLED HIS WIFE. ALL
HIS BELONGINGS PACKED AND PT WALKED OUTSIDE THE UNIT WITH THE NURSE. HE DIDN'T
WANT THE NURSE TO WAIT UNTIL HIS WIFE PICKED HIM UP, SO NURSE BACK TO THE
UNIT.

## 2019-07-12 NOTE — NUR
PT DENIES WEARING O2 SUPPORT DESPITE EXPLANATIONS, SAYS HE JUST WANTS TO GET
OUT OF HERE, AND THAT WE ARE CHOKING HIM ON OXYGEN. SURGERY CANCELLED SINCE PT
O2 SATS AT 70s AND NOT HEMODYNAMICALLY STABLE.

## 2019-07-12 NOTE — NUR
ASSESSMENT AS CHARTED. VSS. PATIENT REMAINED ON DOPAMINE GTT DURING SHIFT AT 5
MCG/KG/MIN. PATIENT RESISTANT TO TREATMENT, BUT AGREEABLE WITH EXPLANATION.
TITRATED O2 UP TO 10L ON HFNC. PATIENT RESISTANT TO KEEPING OXYGEN IN PLACE.
CONSENT FOR BLOOD TRANSFUSION OBTAINED AHEAD OF SURGERY. PATIENT SCHEDULED FOR
LEFT CAROTID ENDARDECTOMY AT 0730 TODAY. PATIENT NPO SINCE MIDNIGHT. WILL
CONTINUE TO MONITOR UNTIL PATIENT LEAVES FOR SURGERY.

## 2019-07-12 NOTE — NUR
PT IN BED ON RA, SATURATION OF 78%, PT REFUSES SUPPLIMENTAL OXYGEN, SAYS "HE
DOES NOT NEED IT", REFUSES BREATHING TREATMENT, PT TOLD THIS RT TO LEAVE HIS
ROOM, NO OXYGEN WAS ADMINISTERED AND NO TREATMENT WAS GIVEN.

## 2019-07-13 NOTE — CON
[address]  [address]                    CONSULTATION                  
_______________________________________________________________________________
 
Name:       DANDRE SCHULTZ                  Room:           [address] [place]  
M.R.#:  [id number]      Account #:      [id number]  
Admission:  07/08/19     Attend Phys:    Dandre Burns MD 
Discharge:  07/12/19     Date of Birth:  [birth year]  
         Report #: [id number]
                                                                     [id number]  
_______________________________________________________________________________
THIS REPORT FOR:  //name//                      
 
CC: FAM physician/PCP
    Dandre Burns MD
    [facility] 
 
DATE OF SERVICE:  07/12/2019
 
 
LOCATION:  Bed 3 in the ICU.
 
ATTENDING PHYSICIAN:  Dr. Burns.
 
INDICATION FOR CONSULTATION:  Hypoxemia and COPD.
 
HISTORY OF PRESENT ILLNESS:  The patient is a 63-year-old male, current pipe
smoker, who was admitted to the Emergency Department on 07/08 with complaints of
chest pain radiating to his left arm.  He was taken to the cath lab and found he
had patent stents and medical management with beta blockers was noted.  He has a
left 90% stenosis of his left internal carotid artery.  He may need to have a
carotid endarterectomy on the left.  He has already had a previous one on the
right.  He left AMA at some point in time about a month ago when he was having
difficulties.  Also he has a left diabetic foot ulcer and he may need
amputation.  He is on Plavix at this time and he was hypoxic on 10 liters.  He
states he has had a cough, but no fever, chills or sweat.  He is short of breath
at home.  He does take a Ventolin inhaler at home p.r.n.  Again, he is a pipe
smoker of 1 to 2 bowls a day.
 
ALLERGIES:  INTOLERANCE TO CONTRAST DYE, PENICILLINS, NITROGLYCERIN, CODEINE AND
FENTANYL, ALL OF WHICH GIVE HIM NAUSEA.  ALSO TRAMADOL, WHICH GIVES HIM A
HEADACHE.
 
OUTPATIENT MEDICATIONS:  Including metformin 500 mg b.i.d., albuterol inhaler 2
puffs p.r.n., metoprolol 25 mg b.i.d., atorvastatin 10 mg at bedtime, tamsulosin
0.4 mg at bedtime, enteric coated aspirin 81 mg daily, Plavix 75 mg daily,
gabapentin 300 mg q.i.d., sertraline was 25 mg daily, sliding scale insulin.  He
is also on Solu-Medrol 40 mg IV push every 8 hours, DuoNeb treatments every four
hours.  He is on vancomycin and ceftriaxone for his foot ulcer.  Oxygen is
currently at 7 liters.
 
OTHER PAST MEDICAL HISTORY:  Diabetes, heart disease, peripheral vascular
disease, hypertension, carotid artery disease and a previous abdominal aortic
aneurysm.
 
PAST SURGICAL HISTORY:  He had a penetrating injury to the abdomen as a child,
 
 
 
[place] [address]                    CONSULTATION                  
_______________________________________________________________________________
 
Name:       DANDRE SCHULTZ                  Room:           [address] [place]  
M.R.#:  [id number]      Account #:      [id number]  
Admission:  07/08/19     Attend Phys:    Dandre Burns MD 
Discharge:  07/12/19     Date of Birth:  [birth year]  
         Report #: [id number]
                                                                     [id number]  
_______________________________________________________________________________
otherwise negative for premature cardiopulmonary disease.
 
SOCIAL HISTORY:  I believe he lives by himself at home.  Smokes a pipe.  Denies
any alcohol or illicit drug use.
 
REVIEW OF SYSTEMS:  A 14-point review of systems was attempted, review was
negative except for pertinent positives noted in the HPI.
 
PHYSICAL EXAMINATION:
GENERAL:  A 63-year-old male who was not very cooperative this morning.  He
answered occasionally yes and no questions if he would answer at all.
VITAL SIGNS:  Currently, his blood pressure is 122/62 on low-dose dopamine,
heart rate is 96 and regular, respirations 16-20, and saturation on 7 liters is
96%.  He is 5 feet 10 inches tall, weight 70 kilograms or 154 pounds, BMI is 22.
HEENT:  Nares and pharynx otherwise are clear.
NECK:  Supple, without nodes.  No increased jugular venous pressure.
CHEST:  Reveals diminished breath sounds, prolonged expiratory phase.  No wheeze
noted.
CARDIOVASCULAR:  Regular rate and rhythm without murmur, gallop or rub.  Heart
rate is 96.
ABDOMEN:  Soft.  No masses or megaly.
EXTREMITIES:  His left foot is bandaged up.  Peripheral pulses are 0 to 1+
bilaterally.  He has poor peripheral pulses.  He does move all 4 extremities to
command.
NEUROLOGIC:  Nonfocal least at this time.
 
LABORATORY DATA:  From 07/11/2019 shows hemoglobin 7.8, white count 8600,
platelets are 361,000.  Sodium is 138, potassium is 4.6, BUN is 13, creatinine
0.7, glucose is 220, calcium is 8.4.  ABGs; pO2 of 69, pH 7.33, pCO2 is 48,
bicarbonate is 25 with a carboxyhemoglobin was 0.4, sat was 90% yesterday
morning.  Chest x-ray shows COPD, hyperinflation, patchy bibasilar infiltrates
and very small pleural effusions.  He may have some interstitial infiltrates in
his lower lobes.  Heart size does not appear enlarged.  Pulmonary arteries are
not prominent.  No masses or tumors are noted.  CT of the head shows old left
lentiform nucleus lacunar infarct, old stroke disease.
 
IMPRESSION:
1.  Probably moderate to moderately severe chronic obstructive pulmonary
disease.
2.  Peripheral vascular disease.
3.  Carotid artery disease with 90% left internal carotid artery stenosis.  May
need left carotid endarterectomy.
4.  Diabetes out of control with vasculopathy and neuropathy.
5.  Hypoxemia related to multiple factors.
 
PLAN:  Continue to wean O2.  We will check another blood gas this morning and
 
 
 
[facility] 
[address] [place] [address]                    CONSULTATION                  
_______________________________________________________________________________
 
Name:       MARIONDANDRE JULIEN                  Room:           [address] [place]  
M.R.#:  [id number]      Account #:      [id number]  
Admission:  07/08/19     Attend Phys:    Dandre Burns MD 
Discharge:  07/12/19     Date of Birth:  [birth year]  
         Report #: [id number]
                                                                     [id number]  
_______________________________________________________________________________
then see if he will be an adequate candidate.  There is question whether he is
going to go to the OR today or not.  He is still on Plavix.  He may need to get
his carotid arteries fixed and get endarterectomy, which I think he used to be
at moderate increased risk, but he should be able to go off the blood gases are
stable on 6 or 7 liters.  Hopefully, will need to be intubated after surgery. 
This has been a 37-minute critical care consult.
 
 
 
 
 
 
 
 
 
 
 
 
 
 
 
 
 
 
 
 
 
 
 
 
 
 
 
 
 
 
 
 
 
 
 
 
 
 
<ELECTRONICALLY SIGNED>
                                        By:  Ari Reed MD         
07/13/19     0718
D: 07/12/19 0736_______________________________________
T: 07/12/19 0906Ari Reed MD            /nt

## 2019-07-28 NOTE — NUR
PT IS ABLE TO COMMUNICATE HIS NEEDS TO STAFF WITHOUT DIFFICULTY. CURRENT PAIN
MEDICATION REGIMEN HAS BEEN ADEQUATE FOR CONTROLLING HIS PAIN UP TO THIS TIME.
VASCULAR SURGERY AND PODIATRY FOLLOWING RE LEFT FOOT. Past Medical History


Past Medical History:  Diabetes-Type II


Additional Past Medical Histor:  DIABETIC RIGHT FOOT ULCER


 (RONALDO VALDZE Jr. DO)


Past Surgical History:  Other


Additional Past Surgical Histo:  RIGHT FOOT


 (RONALDO VALDEZ Jr. DO)


Alcohol Use:  Occasionally


Drug Use:  None


 (RONALDO VALDEZ Jr., DO)





Adult General


Chief Complaint


Chief Complaint:  MECHANICAL FALL





HPI


HPI





Patient is a 40-year-old male who presents via EMS after reportedly falling 

while walking down stairs this morning. Patient states that he hit his head and 

complains of head and neck pain and also has complaint of left knee and right 

foot pain. Patient rates his pain at an 8 out of 10. Patient indicates that he 

is not sure whether or not he lost consciousness. He denies any chest pain or 

shortness breath. He also denies any nausea or vomiting.[]


 (RONALDO VALDEZ Jr. DO)





Review of Systems


Review of Systems





Constitutional: Denies fever or chills []


Eyes: Denies change in visual acuity, redness, or eye pain []


Respiratory: Denies cough or shortness of breath []


Cardiovascular: No additional information not addressed in HPI []


GI: Denies abdominal pain, nausea, vomiting or diarrhea []


Musculoskeletal: Complains of neck, left knee and right foot pain []


Integument: Denies rash or skin lesions []


Neurologic: Complains of headache without focal weakness or sensory changes []





All other systems were reviewed and found to be within normal limits, except as 

documented in this note.


 (RONALDO VALDEZ Jr. DO)





Allergies


Allergies





Allergies








Coded Allergies Type Severity Reaction Last Updated Verified


 


  piperacillin Allergy Intermediate  19 Yes


 


  tazobactam Allergy Intermediate  18 Yes





 (RODOLFO RUELAS DO)





Physical Exam


Physical Exam





Constitutional: Well developed, well nourished, no acute distress, non-toxic 

appearance. []


HENT: Normocephalic, atraumatic, bilateral external ears normal, oropharynx 

moist, no oral exudates, nose normal. []


Eyes: PERRLA, EOMI, conjunctiva normal, no discharge. [] 


Neck: Normal range of motion, no tenderness, supple, no stridor. [] 


Cardiovascular:Heart rate regular rhythm, no murmur []


Lungs & Thorax:  Bilateral breath sounds clear to auscultation []


Abdomen: Bowel sounds normal, soft, no tenderness, no masses, no pulsatile 

masses. [] 


Skin: Warm, dry, no erythema, no rash. [] 


Back: No tenderness, no CVA tenderness. [] 


Extremities: No tenderness, no cyanosis, no clubbing, ROM intact, no edema. [] 


Neurologic: Alert and oriented X 3, normal motor function, normal sensory 

function, no focal deficits noted. []


Psychologic: Affect normal, judgement normal, mood normal. []


 (RONLADO VALDEZ Jr., DO)





Current Patient Data


Vital Signs





                                   Vital Signs








  Date Time  Temp Pulse Resp B/P (MAP) Pulse Ox O2 Delivery O2 Flow Rate FiO2


 


19 07:00  94 16  97   


 


19 04:27 98.7   126/75 (92)    





 98.7       





 (RODOLFO RUELAS DO)





EKG


EKG


[]


 (RONALDO VALDEZ Jr., DO)





Radiology/Procedures


Radiology/Procedures


[]


 (RONALDO VALDEZ Jr., DO)


Radiology/Procedures


[facility]


                    [address] [place] [address] [phone]


                                        


                                 IMAGING REPORT





                                     Signed





PATIENT: VERITO LANGE      ACCOUNT: [id number]     MRN#: [id number]


: 1978           LOCATION: ER              AGE: 40


SEX: M                    EXAM DT: 19         ACCESSION#: [id number]


STATUS: REG ER            ORD. PHYSICIAN: RONALDO VALDEZ Jr., DO   


REASON: FALL


PROCEDURE: CT HEAD AND CERVICAL SPINE WO





EXAM: CT HEAD WITHOUT IV CONTRAST


 


CLINICAL HISTORY: Fall


 


COMPARISON: None.


 


TECHNIQUE:


Routine CT of the head without contrast. Soft tissues and bone windows 


were reviewed.


 


PQRS compliance statement - One or more of the following individualized 


dose reduction techniques were utilized for this study:


1.  Automated exposure control


2.  Adjustment of the mA and/or kV according to patient size


3.  Use of iterative reconstruction technique


 


FINDINGS:  


There is no evidence of hemorrhage, mass or extra-axial fluid collection.


 


Grey-white differentiation is maintained with no evidence of edema. 


 


There is no mass effect or shift of the intracranial structures.


 


The ventricles, basilar cisterns and cortical sulci are normal in size and


configuration for the patients stated age.


 


The cerebellum and brainstem are unremarkable.  


 


The calvarium demonstrates no evidence of fracture or focal lesion.


 


There is normal aeration of the visualized paranasal sinuses and mastoid 


air cells.


 


The visualized portions of the orbits are normal.


 


 


 


IMPRESSION:


No evidence for acute intracranial process


___________________________________


EXAM: CT CERVICAL SPINE WITHOUT IV CONTRAST


 


CLINICAL HISTORY: Fall


 


COMPARISON: None available.


 


TECHNIQUE: Helical CT of the cervical spine was performed. Axial, coronal 


and sagittal reformatted images were also performed.


 


PQRS compliance statement - One or more of the following individualized 


dose reduction techniques were utilized for this study:


1.  Automated exposure control


2.  Adjustment of the mA and/or kV according to patient size


3.  Use of iterative reconstruction technique


 


FINDINGS: 


 


Vertebral body heights are preserved. Disc heights are grossly preserved. 


No spondylolisthesis. Straightening of the normal cervical lordosis. No 


evidence for acute fracture. Mild atlantodental degenerative changes are 


seen.


 


IMPRESSION:


No evidence for acute fracture or subluxation.


 


Electronically signed by: Carson Greco MD (2019 6:27 AM) [facility]














DICTATED and SIGNED BY:     CARSON GRECO MD


DATE:     19














xray of andreina, ankle and knee: no fracture or dislocation. 


 (RODOLFO RUELAS DO)





Course & Med Decision Making


Course & Med Decision Making


Pertinent Labs and Imaging studies reviewed. (See chart for details)





Patient moved to room upon arrival was evaluated by your medical staff after 

which imaging was ordered to include CT imaging of head and cervical spine as 

well as plain x-rays of the left knee, right ankle and foot. At this time, 

imaging is still pending and patient is being signed out to Dr. Ruelas at 6:00 AM.


 (RONALDO VALDEZ Jr., DO)





Dragon Disclaimer


Dragon Disclaimer


This electronic medical record was generated, in whole or in part, using a voice

 recognition dictation system.


 (RONALDO VALDEZ Jr., DO)





Departure


Departure


Impression:  


   Primary Impression:  


   Head injury


   Additional Impressions:  


   Ankle pain


   Knee pain


Disposition:  01 HOME, SELF-CARE


Condition:  STABLE


Referrals:  


UNKNOWN PCP NAME (PCP)


follow up with your doctor as needed


Patient Instructions:  Contusion, Head Injury, Adult





Problem Qualifiers











RONALDO VALDEZ Jr., DO          2019 04:52


RODOLFO RUELAS DO                2019 07:43

## 2019-08-06 ENCOUNTER — HOSPITAL ENCOUNTER (INPATIENT)
Dept: HOSPITAL 35 - ER | Age: 64
LOS: 1 days | Discharge: HOME | DRG: 556 | End: 2019-08-07
Attending: HOSPITALIST | Admitting: HOSPITALIST
Payer: COMMERCIAL

## 2019-08-06 ENCOUNTER — HOSPITAL ENCOUNTER (EMERGENCY)
Dept: HOSPITAL 96 - M.ERS | Age: 64
Discharge: LEFT BEFORE DISCHARGE | End: 2019-08-06
Payer: MEDICAID

## 2019-08-06 VITALS — DIASTOLIC BLOOD PRESSURE: 77 MMHG | SYSTOLIC BLOOD PRESSURE: 129 MMHG

## 2019-08-06 VITALS — WEIGHT: 179 LBS | HEIGHT: 70 IN | BODY MASS INDEX: 25.62 KG/M2

## 2019-08-06 VITALS — SYSTOLIC BLOOD PRESSURE: 122 MMHG | DIASTOLIC BLOOD PRESSURE: 65 MMHG

## 2019-08-06 VITALS — SYSTOLIC BLOOD PRESSURE: 124 MMHG | DIASTOLIC BLOOD PRESSURE: 66 MMHG

## 2019-08-06 VITALS — WEIGHT: 178.99 LBS | HEIGHT: 70 IN | BODY MASS INDEX: 25.62 KG/M2

## 2019-08-06 VITALS — DIASTOLIC BLOOD PRESSURE: 57 MMHG | SYSTOLIC BLOOD PRESSURE: 140 MMHG

## 2019-08-06 DIAGNOSIS — Z82.49: ICD-10-CM

## 2019-08-06 DIAGNOSIS — Z79.899: ICD-10-CM

## 2019-08-06 DIAGNOSIS — J44.9: ICD-10-CM

## 2019-08-06 DIAGNOSIS — Z88.8: ICD-10-CM

## 2019-08-06 DIAGNOSIS — E78.5: ICD-10-CM

## 2019-08-06 DIAGNOSIS — E11.9: ICD-10-CM

## 2019-08-06 DIAGNOSIS — R07.89: Primary | ICD-10-CM

## 2019-08-06 DIAGNOSIS — I25.2: ICD-10-CM

## 2019-08-06 DIAGNOSIS — Z95.5: ICD-10-CM

## 2019-08-06 DIAGNOSIS — Z88.0: ICD-10-CM

## 2019-08-06 DIAGNOSIS — Z88.6: ICD-10-CM

## 2019-08-06 DIAGNOSIS — I25.10: ICD-10-CM

## 2019-08-06 DIAGNOSIS — E78.00: ICD-10-CM

## 2019-08-06 DIAGNOSIS — F17.200: ICD-10-CM

## 2019-08-06 DIAGNOSIS — Z98.1: ICD-10-CM

## 2019-08-06 DIAGNOSIS — Z91.041: ICD-10-CM

## 2019-08-06 DIAGNOSIS — R07.89: ICD-10-CM

## 2019-08-06 DIAGNOSIS — Z91.040: ICD-10-CM

## 2019-08-06 DIAGNOSIS — I10: ICD-10-CM

## 2019-08-06 DIAGNOSIS — Z79.4: ICD-10-CM

## 2019-08-06 DIAGNOSIS — Z79.82: ICD-10-CM

## 2019-08-06 DIAGNOSIS — K21.9: ICD-10-CM

## 2019-08-06 DIAGNOSIS — Z91.048: ICD-10-CM

## 2019-08-06 DIAGNOSIS — Z79.01: ICD-10-CM

## 2019-08-06 DIAGNOSIS — Z88.4: ICD-10-CM

## 2019-08-06 DIAGNOSIS — M25.512: Primary | ICD-10-CM

## 2019-08-06 DIAGNOSIS — Z88.5: ICD-10-CM

## 2019-08-06 DIAGNOSIS — Z71.6: ICD-10-CM

## 2019-08-06 DIAGNOSIS — F17.210: ICD-10-CM

## 2019-08-06 LAB
%HYPO/RBC NFR BLD AUTO: (no result) %
%HYPO/RBC NFR BLD AUTO: (no result) %
ABSOLUTE EOSINOPHILS: 0.6 THOU/UL (ref 0–0.7)
ABSOLUTE MONOCYTES: 0.4 THOU/UL (ref 0–1.2)
ALBUMIN SERPL-MCNC: 2.9 G/DL (ref 3.4–5)
ALP SERPL-CCNC: 110 U/L (ref 46–116)
ALT SERPL-CCNC: 18 U/L (ref 30–65)
ANION GAP SERPL CALC-SCNC: 7 MMOL/L (ref 7–16)
ANION GAP SERPL CALC-SCNC: 8 MMOL/L (ref 7–16)
ANISOCYTOSIS BLD QL SMEAR: (no result)
ANISOCYTOSIS BLD QL SMEAR: (no result)
APTT BLD: 23.2 SECONDS (ref 25–31.3)
APTT BLD: 24 SECONDS (ref 24.5–32.8)
AST SERPL-CCNC: 10 U/L (ref 15–37)
BASOPHILS NFR BLD AUTO: 2 % (ref 0–2)
BILIRUB SERPL-MCNC: 0.2 MG/DL
BUN SERPL-MCNC: 5 MG/DL (ref 7–18)
BUN SERPL-MCNC: 9 MG/DL (ref 7–18)
CALCIUM SERPL-MCNC: 8.5 MG/DL (ref 8.5–10.1)
CALCIUM SERPL-MCNC: 8.7 MG/DL (ref 8.5–10.1)
CHLORIDE SERPL-SCNC: 102 MMOL/L (ref 98–107)
CHLORIDE SERPL-SCNC: 103 MMOL/L (ref 98–107)
CHOLEST SERPL-MCNC: 90 MG/DL (ref ?–200)
CK-MB MASS: 3.2 NG/ML
CO2 SERPL-SCNC: 25 MMOL/L (ref 21–32)
CO2 SERPL-SCNC: 29 MMOL/L (ref 21–32)
CREAT SERPL-MCNC: 0.9 MG/DL (ref 0.6–1.3)
CREAT SERPL-MCNC: 0.9 MG/DL (ref 0.7–1.3)
EOSINOPHIL NFR BLD: 10 %
EOSINOPHIL NFR BLD: 12 % (ref 0–3)
ERYTHROCYTE [DISTWIDTH] IN BLOOD BY AUTOMATED COUNT: 24.2 % (ref 10.5–14.5)
GLUCOSE SERPL-MCNC: 280 MG/DL (ref 74–106)
GLUCOSE SERPL-MCNC: 314 MG/DL (ref 70–99)
GRANULOCYTES NFR BLD MANUAL: 48 %
GRANULOCYTES NFR BLD MANUAL: 50 % (ref 36–66)
HCT VFR BLD CALC: 32.3 % (ref 42–52)
HCT VFR BLD CALC: 32.5 % (ref 42–52)
HDLC SERPL-MCNC: 30 MG/DL (ref 40–?)
HGB BLD-MCNC: 10 GM/DL (ref 14–18)
HGB BLD-MCNC: 10.1 GM/DL (ref 14–18)
INR PPP: 1.1
INR PPP: 1.1
LDLC SERPL-MCNC: 26 MG/DL (ref ?–100)
LIPASE: 69 U/L (ref 73–393)
LYMPHOCYTES # BLD: 2.3 THOU/UL (ref 0.8–5.3)
LYMPHOCYTES NFR BLD AUTO: 33 % (ref 24–44)
LYMPHOCYTES NFR BLD AUTO: 34 %
MAGNESIUM SERPL-MCNC: 1.3 MG/DL (ref 1.8–2.4)
MCH RBC QN AUTO: 22.1 PG (ref 26–34)
MCH RBC QN AUTO: 22.2 PG (ref 26–34)
MCHC RBC AUTO-ENTMCNC: 30.9 G/DL (ref 28–37)
MCHC RBC AUTO-ENTMCNC: 31.2 G/DL (ref 28–37)
MCV RBC: 71.1 FL (ref 80–100)
MCV RBC: 71.5 FL (ref 80–100)
MICROCYTES: (no result)
MICROCYTES: (no result)
MONOCYTES NFR BLD: 3 % (ref 1–8)
MONOCYTES NFR BLD: 6 %
MPV: 7 FL. (ref 7.2–11.1)
NEUTROPHILS # BLD: 3 THOU/UL (ref 1.6–8.1)
NEUTROPHILS # BLD: 3.7 THOU/UL (ref 1.4–8.2)
NT-PRO BRAIN NAT PEPTIDE: 2012 PG/ML (ref ?–300)
NUCLEATED RBCS: 0 /100WBC
PLATELET # BLD EST: (no result) 10*3/UL
PLATELET # BLD EST: (no result) 10*3/UL
PLATELET # BLD: 434 THOU/UL (ref 150–400)
PLATELET COUNT*: 417 THOU/UL (ref 150–400)
POIKILOCYTOSIS BLD QL SMEAR: (no result)
POLYCHROMASIA BLD QL SMEAR: (no result)
POTASSIUM SERPL-SCNC: 3.5 MMOL/L (ref 3.5–5.1)
POTASSIUM SERPL-SCNC: 3.6 MMOL/L (ref 3.5–5.1)
PROT SERPL-MCNC: 6.4 G/DL (ref 6.4–8.2)
PROTHROMBIN TIME: 11.4 SECONDS (ref 9.3–11.4)
PROTHROMBIN TIME: 11.7 SECONDS (ref 9.2–11.5)
RBC # BLD AUTO: 4.54 MIL/UL (ref 4.5–6)
RBC # BLD AUTO: 4.54 MIL/UL (ref 4.5–6)
RDW-CV: 24.2 % (ref 10.5–14.5)
SODIUM SERPL-SCNC: 135 MMOL/L (ref 136–145)
SODIUM SERPL-SCNC: 139 MMOL/L (ref 136–145)
TC:HDL: 3 RATIO
TRIGL SERPL-MCNC: 173 MG/DL (ref ?–150)
TROPONIN I SERPL-MCNC: <0.06 NG/ML (ref ?–0.06)
TROPONIN-I LEVEL: <0.06 NG/ML (ref ?–0.06)
VARIANT LYMPHS NFR BLD MANUAL: 2 %
VLDLC SERPL CALC-MCNC: 35 MG/DL (ref ?–40)
WBC # BLD AUTO: 6.3 THOU/UL (ref 4–11)
WBC # BLD AUTO: 7.4 THOU/UL (ref 4–11)

## 2019-08-06 PROCEDURE — 10081 I&D PILONIDAL CYST COMP: CPT

## 2019-08-06 NOTE — EKG
[place] [address]
Phone:  [phone]                     ELECTROCARDIOGRAM REPORT      
_______________________________________________________________________________
 
Name:       DANDRE SCHULTZ                  Room:                      REG ER  
M.R.#:  [id number]      Account #:      [id number]  
Admission:  19     Attend Phys:                         
Discharge:               Date of Birth:  [birth year]  
         Report #: [id number]
    [id number]
_______________________________________________________________________________
THIS REPORT FOR:  //name//                      
 
                         [facility] ED
                                       
Test Date:    2019               Test Time:    14:16:16
Pat Name:     DANDRE SCHULTZ              Department:   
Patient ID:   [id number]            Room:          
Gender:       M                        Technician:   AL
:          1955               Requested By: Enrique West
Order Number: [id number] MD:   Miguel Angel Hoff
                                 Measurements
Intervals                              Axis          
Rate:         82                       P:            78
[place]:           188                      QRS:          76
QRSD:         92                       T:            257
QT:           391                                    
QTc:          457                                    
                           Interpretive Statements
Sinus rhythm
Probable left atrial enlargement
LVH with secondary repolarization abnormality
Compared to ECG 2019 09:04:13
Left ventricular hypertrophy now present
 
Ventricular premature complex(es) no longer present
 
Electronically Signed On 2019 14:46:15 CDT by Miguel Angel Hoff
[email or web address]
 
 
 
 
 
 
 
 
 
 
 
 
 
 
 
  <ELECTRONICALLY SIGNED>
                                           By: Miguel Angel Hoff MD, [facility]      
  19     1446
D: 19 141   _____________________________________
T: 19   Miguel Angel Hoff MD, [facility]        /EPI

## 2019-08-07 VITALS — DIASTOLIC BLOOD PRESSURE: 57 MMHG | SYSTOLIC BLOOD PRESSURE: 145 MMHG

## 2019-08-07 NOTE — NUR
ADM. NOTE. PT ARRIVED ON UNIT AROUND 2200 FOR ED FOR CHEST PAIN.
ALERT AND ORIENTED. PT IS ALLERGIC TO NITRO.  MORPHINE GIVEN X 1 ON THE UNIT.
PT WOULD ONLY TAKE MORPHINE.  REFUSED OXYGEN WHEN HIS O2 WAS LESS THAN 90%/..
REFUSED GI COCKTAIL.  PT WAS NPO SINCE MIDNIGHT.  NO C/O CP THIS FAR.  PT
GENERALLY NON COMPLAIANT WITH PLAN OF CARE.  CONTINOUS EDUCATION IS NEEDED.
WILL FOLLOW POC

## 2019-08-07 NOTE — NUR
PRIOR TO REPORT, PT CALLED TO SAY HE HAD CP, RATES AT 8/10, STATES HE HAD ALL
NIGHT BUT NO ONE CARES, NIGHT NURSE ASKED THIS A.M. WHEN PASSING EARLY AM MED
AND PT DENIED ANY CP. VS WNL. DECLINES VS YET WENT AHEAD AND TOOK THEM. NIGHT
STAFF REPORTS PT DECLINED PHOTOGRAPH OF A MISSING LITTLE TOE ON LEFT FOOT,
WILL ACCOMPLISH TODAY IF ALLOWED. AFTER GIVEN MS THIS A.M. RATED PAIN LESS,
WOULD NOT OPEN EYES FOR ME TO SHOW HIM CALL LIGHT USE, STATES HE ALREADY
KNOWS. SPOKE W/CARDIOLOGY NP TO GIVE BRIEF UPDATE. WILL CONTINUE TO MONITOR.
REPORTS ALSO OF SATS LOW WITHOUT 02 NC, WILL RELAY TO HOSPITALIST THIS A.M.
SHOULD RT TX BE NEEDED. COARSE LUNGS AT THIS TIME.

## 2019-08-07 NOTE — NUR
REFUSING 02 TOOK MEDS W/ARGUMENT. GENTLE EDUCATION GIVEN RE: CP AND 02 AND FOR
PT TO MAKE GREATER ATTEMPTS AT DEEPER INHALATIONS/EXHALATIONS FOR GAS EXCHANGE
CONTRIBUTING TO GREATER PAIN RELIEF AND HIGHER 02 RATES, HE DECLINES,
FERVENTLY

## 2019-08-07 NOTE — NUR
GAVE PT HIS D/C PAPERS, RX FOR TYLENOL ARTHRITIS. HE SIGNED, STATED HE ALREADY
KNOWS EVERYTHING I WAS IN THE PROCESS OF TELLING HIM AND WAVED ME OFF WITH HIS
HAND AND PUT HIS HEAD DOWN. ASKED HIM TO STAY IN W/C OR TO CALL OUT FOR HELP
IF HE NEEDED TO USE RESTROOM OR WALK AS WE HAVE TO ENSURE SAFETY. PT HAS
REFUSED MOST EVERYTHING SO HAS BEEN D/C'D. WHILE I WAS AT LUNCH ANOTHER NURSE
CALLED SECURITY BECAUSE PT WAS WANDERING THE HALLS BEING AGGRESSIVE WITH HIS
SPEECH AND NONCOMPLIANT WITH SAFETY/CARES. WILL CONTINUE TO MONITOR UNTIL HIS
CAB ARRIVES. ALL BELONGINGS W/PT IN A BAG.

## 2019-08-08 LAB
EST. AVERAGE GLUCOSE BLD GHB EST-MCNC: 249 MG/DL
GLYCOHEMOGLOBIN (HGB A1C): 10.3 % (ref 4.8–5.6)

## 2019-08-18 ENCOUNTER — HOSPITAL ENCOUNTER (INPATIENT)
Dept: HOSPITAL 96 - M.ERS | Age: 64
LOS: 1 days | Discharge: LEFT BEFORE DISCHARGE | DRG: 303 | End: 2019-08-19
Attending: INTERNAL MEDICINE | Admitting: INTERNAL MEDICINE
Payer: MEDICAID

## 2019-08-18 VITALS — SYSTOLIC BLOOD PRESSURE: 117 MMHG | DIASTOLIC BLOOD PRESSURE: 54 MMHG

## 2019-08-18 VITALS — WEIGHT: 145.4 LBS | BODY MASS INDEX: 20.81 KG/M2 | HEIGHT: 70 IN

## 2019-08-18 VITALS — SYSTOLIC BLOOD PRESSURE: 118 MMHG | DIASTOLIC BLOOD PRESSURE: 61 MMHG

## 2019-08-18 VITALS — DIASTOLIC BLOOD PRESSURE: 74 MMHG | SYSTOLIC BLOOD PRESSURE: 115 MMHG

## 2019-08-18 VITALS — SYSTOLIC BLOOD PRESSURE: 117 MMHG | DIASTOLIC BLOOD PRESSURE: 64 MMHG

## 2019-08-18 DIAGNOSIS — D64.9: ICD-10-CM

## 2019-08-18 DIAGNOSIS — I44.7: ICD-10-CM

## 2019-08-18 DIAGNOSIS — J44.9: ICD-10-CM

## 2019-08-18 DIAGNOSIS — E11.65: ICD-10-CM

## 2019-08-18 DIAGNOSIS — I25.10: ICD-10-CM

## 2019-08-18 DIAGNOSIS — E11.51: ICD-10-CM

## 2019-08-18 DIAGNOSIS — Z88.5: ICD-10-CM

## 2019-08-18 DIAGNOSIS — Z91.041: ICD-10-CM

## 2019-08-18 DIAGNOSIS — I50.32: ICD-10-CM

## 2019-08-18 DIAGNOSIS — I11.0: ICD-10-CM

## 2019-08-18 DIAGNOSIS — Z88.0: ICD-10-CM

## 2019-08-18 DIAGNOSIS — Z95.5: ICD-10-CM

## 2019-08-18 DIAGNOSIS — Z82.49: ICD-10-CM

## 2019-08-18 DIAGNOSIS — Z79.84: ICD-10-CM

## 2019-08-18 DIAGNOSIS — I25.110: Primary | ICD-10-CM

## 2019-08-18 DIAGNOSIS — E78.5: ICD-10-CM

## 2019-08-18 DIAGNOSIS — Z91.19: ICD-10-CM

## 2019-08-18 DIAGNOSIS — E87.1: ICD-10-CM

## 2019-08-18 DIAGNOSIS — Z53.21: ICD-10-CM

## 2019-08-18 DIAGNOSIS — I25.2: ICD-10-CM

## 2019-08-18 DIAGNOSIS — Z89.429: ICD-10-CM

## 2019-08-18 DIAGNOSIS — E78.00: ICD-10-CM

## 2019-08-18 DIAGNOSIS — Z88.8: ICD-10-CM

## 2019-08-18 DIAGNOSIS — E44.1: ICD-10-CM

## 2019-08-18 DIAGNOSIS — Z91.048: ICD-10-CM

## 2019-08-18 DIAGNOSIS — F17.290: ICD-10-CM

## 2019-08-18 DIAGNOSIS — Z79.82: ICD-10-CM

## 2019-08-18 DIAGNOSIS — Z91.040: ICD-10-CM

## 2019-08-18 LAB
%HYPO/RBC NFR BLD AUTO: (no result) %
ABSOLUTE BASOPHILS: 0.1 THOU/UL (ref 0–0.2)
ABSOLUTE EOSINOPHILS: 0.1 THOU/UL (ref 0–0.7)
ABSOLUTE MONOCYTES: 0.4 THOU/UL (ref 0–1.2)
ALBUMIN SERPL-MCNC: 3.2 G/DL (ref 3.4–5)
ALP SERPL-CCNC: 127 U/L (ref 46–116)
ALT SERPL-CCNC: 16 U/L (ref 30–65)
ANION GAP SERPL CALC-SCNC: 8 MMOL/L (ref 7–16)
ANISOCYTOSIS BLD QL SMEAR: (no result)
AST SERPL-CCNC: 6 U/L (ref 15–37)
BASOPHILS NFR BLD AUTO: 1.4 %
BILIRUB SERPL-MCNC: 0.2 MG/DL
BUN SERPL-MCNC: 7 MG/DL (ref 7–18)
CALCIUM SERPL-MCNC: 8.6 MG/DL (ref 8.5–10.1)
CHLORIDE SERPL-SCNC: 95 MMOL/L (ref 98–107)
CO2 SERPL-SCNC: 27 MMOL/L (ref 21–32)
CREAT SERPL-MCNC: 0.9 MG/DL (ref 0.6–1.3)
EOSINOPHIL NFR BLD: 1.4 %
GLUCOSE SERPL-MCNC: 483 MG/DL (ref 70–99)
GRANULOCYTES NFR BLD MANUAL: 61.4 %
HCT VFR BLD CALC: 36.9 % (ref 42–52)
HGB BLD-MCNC: 11.3 GM/DL (ref 14–18)
INR PPP: 1.1
LIPASE: 92 U/L (ref 73–393)
LYMPHOCYTES # BLD: 1.6 THOU/UL (ref 0.8–5.3)
LYMPHOCYTES NFR BLD AUTO: 29 %
MCH RBC QN AUTO: 22.5 PG (ref 26–34)
MCHC RBC AUTO-ENTMCNC: 30.6 G/DL (ref 28–37)
MCV RBC: 73.6 FL (ref 80–100)
MICROCYTES: (no result)
MONOCYTES NFR BLD: 6.8 %
MPV: 7.4 FL. (ref 7.2–11.1)
NEUTROPHILS # BLD: 3.4 THOU/UL (ref 1.6–8.1)
NT-PRO BRAIN NAT PEPTIDE: 1802 PG/ML (ref ?–300)
NUCLEATED RBCS: 0 /100WBC
PLATELET # BLD EST: ADEQUATE 10*3/UL
PLATELET COUNT*: 357 THOU/UL (ref 150–400)
POTASSIUM SERPL-SCNC: 4 MMOL/L (ref 3.5–5.1)
PROT SERPL-MCNC: 6.8 G/DL (ref 6.4–8.2)
PROTHROMBIN TIME: 10.9 SECONDS (ref 9.2–11.5)
RBC # BLD AUTO: 5.02 MIL/UL (ref 4.5–6)
RDW-CV: 24.4 % (ref 10.5–14.5)
SODIUM SERPL-SCNC: 130 MMOL/L (ref 136–145)
TROPONIN-I LEVEL: <0.06 NG/ML (ref ?–0.06)
WBC # BLD AUTO: 5.5 THOU/UL (ref 4–11)

## 2019-08-18 NOTE — NUR
RECEIVED REPOTY FROM HELEN RN IN ER OF EXPECTED ADMISSION AT 1723- DX:
UNSTABLE ANGINA, HYPERGLYCEMIA- PT ARRIVED TO ROOM 228 VIA CART, AX1 TO BED-
CARDIAC MONITOR PLACED AS ORDERED, TRACING SR WITH PVC'S- PT A&O X4, AND NOTED
TO BE VERY GRUMMY- CONTINENT OF BOWEL AND BLADDER- COURSE LUNG SOUNDS WITH
NOTED WHEEZING- WET COUGH NOTED, PT DENIES ANY SPUTUM- VS 98.4 18 117/64 84
94% ON 2L VIA NC- ABD SOFT/FLAT/NON-TENDER, BS X4 QUADS- PT REPORTS LAST BM
8/17/19- FIELD STICK NOTED TO LEFT HAND INTACT WITH HEPARIN THAT WAS STARTED
IN ER INFUSSING- SCATTERED BLE HEALING SCABS NOTED, PT REPORTS TO BE R/T PRIOR
EDEMA- TRACE TO 1+ BLE EDEMA NOTED AT THIS TIME- PT NOTED TO NOT HAVE ANY
TEETH- BS ON ADMISSION NOTED , SCHEDULED INSULIN AND METFORMIN GIVEN-
GLASSES NOTED- PT ORIENTATED TO ROOM, CALL LIGHT AND PERSONAL BELONGINGS WITH
IN REACH- HOURLY ROUNDS IN PLACE R/T SAFETY/NEEDS- ALL NEEDS MET AT THIS
TIME-WCTM

## 2019-08-19 VITALS — SYSTOLIC BLOOD PRESSURE: 114 MMHG | DIASTOLIC BLOOD PRESSURE: 62 MMHG

## 2019-08-19 VITALS — DIASTOLIC BLOOD PRESSURE: 69 MMHG | SYSTOLIC BLOOD PRESSURE: 112 MMHG

## 2019-08-19 VITALS — SYSTOLIC BLOOD PRESSURE: 109 MMHG | DIASTOLIC BLOOD PRESSURE: 47 MMHG

## 2019-08-19 LAB
%HYPO/RBC NFR BLD AUTO: (no result) %
ABSOLUTE BASOPHILS: 0.1 THOU/UL (ref 0–0.2)
ABSOLUTE EOSINOPHILS: 0.6 THOU/UL (ref 0–0.7)
ABSOLUTE MONOCYTES: 0.3 THOU/UL (ref 0–1.2)
ANISOCYTOSIS BLD QL SMEAR: (no result)
BASOPHILS NFR BLD AUTO: 0.9 %
EOSINOPHIL NFR BLD: 9.2 %
GRANULOCYTES NFR BLD MANUAL: 53.4 %
HCT VFR BLD CALC: 34.6 % (ref 42–52)
HGB BLD-MCNC: 10.6 GM/DL (ref 14–18)
LYMPHOCYTES # BLD: 2.1 THOU/UL (ref 0.8–5.3)
LYMPHOCYTES NFR BLD AUTO: 31.4 %
MCH RBC QN AUTO: 22.3 PG (ref 26–34)
MCHC RBC AUTO-ENTMCNC: 30.6 G/DL (ref 28–37)
MCV RBC: 73 FL (ref 80–100)
MICROCYTES: (no result)
MONOCYTES NFR BLD: 5.1 %
MPV: 7.3 FL. (ref 7.2–11.1)
NEUTROPHILS # BLD: 3.7 THOU/UL (ref 1.6–8.1)
NUCLEATED RBCS: 0 /100WBC
OVALOCYTES BLD QL SMEAR: (no result)
PLATELET # BLD EST: ADEQUATE 10*3/UL
PLATELET COUNT*: 295 THOU/UL (ref 150–400)
POIKILOCYTOSIS BLD QL SMEAR: (no result)
POLYCHROMASIA BLD QL SMEAR: (no result)
RBC # BLD AUTO: 4.74 MIL/UL (ref 4.5–6)
RDW-CV: 24.3 % (ref 10.5–14.5)
WBC # BLD AUTO: 6.8 THOU/UL (ref 4–11)

## 2019-08-19 NOTE — NUR
PT C/O OF CHEST PAIN THIS AM. PT 88% ON 2L NC AND TITRATED TO 5L NC. EKG
OBTAINED PER PROTOCOL. NO CARDIOLOGY CONSULT.  NOTIFIED. RECEIVED CARDIOOGY
CONSULT. EKG SHOWED TO CARDIOLOGY.

## 2019-08-19 NOTE — NUR
PT IS A/0 X'S 4. PT STATES HE WAS DISCHARGED BY CARDIOLOGY. EXPLAINED TO
PATIENT HE WAS NOT DISCHARGED. PT STATED HE IS GOING HOME. PT DRESSED IN
CLOTHES, PUT HIS SHOES ON, WALKING AROUND ROOM. REQUESTING PAPER WORK TO
LEAVE. EXPLAINED TO PT HE IS LEAVING AMA. EXPLAINED TO PATIENT THAT HE IS
REQUIRING OXYGEN. PT STATES " I DON'T NEED IT AT HOME". EXPLAINED TO PATIENT
HE IS NEEDING IT TODAY.

## 2019-08-19 NOTE — EKG
[place] [address]
Phone:  [phone]                     ELECTROCARDIOGRAM REPORT      
_______________________________________________________________________________
 
Name:       DANDRE SCHULTZ                  Room:           [address] [place]  
M.R.#:  [id number]      Account #:      [id number]  
Admission:  19     Attend Phys:    Dandre Burns MD 
Discharge:  19     Date of Birth:  [birth year]  
         Report #: [id number]
    [id number]
_______________________________________________________________________________
THIS REPORT FOR:  //name//                      
 
                          [facility]
                                       
Test Date:    2019               Test Time:    08:09:35
Pat Name:     DANDRE SCHULTZ              Department:   
Patient ID:   [id number]            Room:         [address]
Gender:       M                        Technician:   MANISHA
:          1955               Requested By: Dandre Burns
Order Number: [id number]    Wai MD:   Miguel Angel Hoff
                                 Measurements
Intervals                              Axis          
Rate:         84                       P:            80
VT:           174                      QRS:          -57
QRSD:         135                      T:            155
QT:           412                                    
QTc:          488                                    
                           Interpretive Statements
Sinus rhythm
Multiple ventricular premature complexes
Left bundle branch block
 
 
Electronically Signed On 2019 17:14:25 CDT by Miguel Angel Hoff
[email or web address]
 
 
 
 
 
 
 
 
 
 
 
 
 
 
 
 
 
 
  <ELECTRONICALLY SIGNED>
                                           By: Miguel Angel Hoff MD, [facility]      
  19     1714
D: 08/809   _____________________________________
T: 19   Miguel Angel Hoff MD, [facility]        /EPI

## 2019-08-19 NOTE — EKG
[place] [address]
Phone:  [phone]                     ELECTROCARDIOGRAM REPORT      
_______________________________________________________________________________
 
Name:       DANDRE SCHULTZ                  Room:           [address] [place]  
.R.#:  [id number]      Account #:      [id number]  
Admission:  19     Attend Phys:    Dandre Burns MD 
Discharge:  19     Date of Birth:  [birth year]  
         Report #: [id number]
    [id number]
_______________________________________________________________________________
THIS REPORT FOR:  //name//                      
 
                         [facility] ED
                                       
Test Date:    2019               Test Time:    12:39:10
Pat Name:     DANDRE SCHULTZ              Department:   
Patient ID:   [id number]            Room:         [facility]
Gender:       M                        Technician:   ELIAN
:          1955               Requested By: Dakota Poon
Order Number: [id number] MD:   Miguel Angel Hoff
                                 Measurements
Intervals                              Axis          
Rate:         99                       P:            82
MT:           166                      QRS:          -47
QRSD:         142                      T:            121
QT:           385                                    
QTc:          495                                    
                           Interpretive Statements
Sinus rhythm
Multiple ventricular premature complexes
Left bundle branch block
Compared to ECG 2019 14:16:16
Ventricular premature complex(es) now present
Left bundle-branch block now present
 
 
Electronically Signed On 2019 16:59:43 CDT by Miguel Angel Hoff
[email or web address]
 
 
 
 
 
 
 
 
 
 
 
 
 
 
 
  <ELECTRONICALLY SIGNED>
                                           By: Miguel Angel Hoff MD, [facility]      
  19     1659
D: 19 1239   _____________________________________
T: 19 1239   Miguel Angel Hoff MD, [facility]        /EPI

## 2019-08-21 NOTE — CON
[address]  [address]                    CONSULTATION                  
_______________________________________________________________________________
 
Name:       DANDRE SCHULTZ                  Room:           [address] [place]  
M.R.#:  [id number]      Account #:      [id number]  
Admission:  08/18/19     Attend Phys:    Dandre Burns MD 
Discharge:  08/19/19     Date of Birth:  [birth year]  
         Report #: [id number]
                                                                     [id number]  
_______________________________________________________________________________
THIS REPORT FOR:  //name//                      
 
CC: TG physician/PCP
    Dandre Burns
 
DATE OF SERVICE:  08/19/2019
 
 
CARDIOLOGY CONSULTATION
 
HISTORY OF PRESENT ILLNESS:  The patient is a 64-year-old white male, who I was
asked to see in the hospital today after complained of being short of breath. 
The history is obtained from the patient, as well as some old records.  He was
actually here at [place] in May, complained of chest pain.  He has had a
previous carotid endarterectomy.  Because of his history of vascular disease and
complaint of chest pain, Dr. Daniels actually performed a cardiac catheterization
on 07/09, about a month ago.  The LAD had 50% stenosis.  The circumflex had
stents with no significant restenosis.  The right coronary artery had a stent
with no restenosis.  There was a small branch of the ramus artery, had a 90%
ostial stenosis.  There was normal left ventricular systolic function.  Medical
therapy was recommended.  The patient was brought to the Emergency Room by
paramedics yesterday, complaining of chest pain.  He complained of shortness of
breath.  I was asked to see him for further evaluation and treatment.  He is not
very active at this time and uses a cane.
 
PAST MEDICAL HISTORY:  Significant for coronary artery stenting.  He apparently
has been cared for at [facility] in the past.
 
MEDICATIONS:  On admission consists of Plavix, Neurontin, Zoloft, metformin,
metoprolol, Lipitor, Flomax, insulin.
 
ALLERGIES:  HE HAS INTOLERANCE TO CODEINE, FENTANYL.
 
He had a recent removal of a toe.  He has a small abdominal aneurysm,
hypertension, diabetes, hyperlipidemia.
 
SOCIAL HISTORY:  He is single, lives by himself.  Retired from the .  No
smoking or alcohol abuse.
 
REVIEW OF SYSTEMS:  He states he had a previous stroke.  No history of peptic
ulcer disease or liver disease.
 
PHYSICAL EXAMINATION:
GENERAL:  Revealed a frail appearing white male, lying in bed, appeared in no
acute distress.
VITAL SIGNS:  He had a blood pressure of 110/60, pulse is 80.  He is afebrile.
 
 
 
[place] [address]                    CONSULTATION                  
_______________________________________________________________________________
 
Name:       DANDRE SCHULTZ                  Room:           [address]#:  [id number]      Account #:      [id number]  
Admission:  08/18/19     Attend Phys:    Dandre Burns MD 
Discharge:  08/19/19     Date of Birth:  [birth year]  
         Report #: [id number]
                                                                     [id number]  
_______________________________________________________________________________
HEENT:  He was anicteric.  Conjunctivae are pink.  Mucous membranes moist.
NECK:  Neck veins do not appear distended.  No carotid bruits.
CHEST:  Clear to auscultation.
CARDIOVASCULAR:  Regular rate and rhythm.
ABDOMEN:  Soft.
EXTREMITIES:  Had no edema.
SKIN:  Cool and dry.
NEUROLOGIC:  Exam is nonfocal.
 
RADIOLOGICAL DATA:  His ECG showed a sinus rhythm, PVC, and a left bundle-branch
block.  His workup, he had an echocardiogram last month that showed ejection
fraction 60%.  He had a portable chest x-ray last night that showed no acute
abnormality.  Carotid Doppler study done last month showed no significant
stenosis.  He had ABIs last month that showed evidence of PAD of the right lower
extremity.
 
LABORATORY WORK:  Sodium 130, creatinine 0.9, glucose 438.  Troponins all 0.06. 
BNP 1802.  Recent cholesterol is 90, triglycerides 75, HDL 33, LDL 42.  TSH 0.1.
 White blood cell count 6.8, hemoglobin 10.6.
 
IMPRESSION AND RECOMMENDATIONS:
1.  Chest pain.  Previous stenting.  Heart catheterization a month ago showed no
significant stenosis.  Recommend medical therapy.
2.  Diabetes.
3.  Hypertension.
4.  Hyperlipidemia.
5.  Peripheral arterial disease with recent toe amputation.
6.  History of previous stroke.
7.  Anemia.  No history of bleeding.
 
 
 
 
 
 
 
 
 
 
 
 
 
 
 
<ELECTRONICALLY SIGNED>
                                        By:  Miguel Angel Hoff MD, [facility]      
08/21/19     1636
D: 08/19/19 0905_______________________________________
T: 08/19/19 1044Daviamando Hoff MD, [facility]         /nt

## 2019-09-01 ENCOUNTER — HOSPITAL ENCOUNTER (EMERGENCY)
Dept: HOSPITAL 96 - M.ERS | Age: 64
Discharge: HOME | End: 2019-09-01
Payer: MEDICAID

## 2019-09-01 VITALS — BODY MASS INDEX: 21.47 KG/M2 | WEIGHT: 150 LBS | HEIGHT: 70 IN

## 2019-09-01 VITALS — SYSTOLIC BLOOD PRESSURE: 109 MMHG | DIASTOLIC BLOOD PRESSURE: 59 MMHG

## 2019-09-01 DIAGNOSIS — Z91.040: ICD-10-CM

## 2019-09-01 DIAGNOSIS — Z91.048: ICD-10-CM

## 2019-09-01 DIAGNOSIS — E78.5: ICD-10-CM

## 2019-09-01 DIAGNOSIS — I11.0: ICD-10-CM

## 2019-09-01 DIAGNOSIS — F17.200: ICD-10-CM

## 2019-09-01 DIAGNOSIS — Z88.8: ICD-10-CM

## 2019-09-01 DIAGNOSIS — Z88.5: ICD-10-CM

## 2019-09-01 DIAGNOSIS — R07.89: Primary | ICD-10-CM

## 2019-09-01 DIAGNOSIS — Z91.041: ICD-10-CM

## 2019-09-01 DIAGNOSIS — E11.9: ICD-10-CM

## 2019-09-01 DIAGNOSIS — I25.10: ICD-10-CM

## 2019-09-01 DIAGNOSIS — I50.32: ICD-10-CM

## 2019-09-01 DIAGNOSIS — J44.9: ICD-10-CM

## 2019-09-01 DIAGNOSIS — Z88.6: ICD-10-CM

## 2019-09-01 DIAGNOSIS — Z88.0: ICD-10-CM

## 2019-09-01 DIAGNOSIS — E78.00: ICD-10-CM

## 2019-09-02 ENCOUNTER — HOSPITAL ENCOUNTER (INPATIENT)
Dept: HOSPITAL 96 - M.ERS | Age: 64
LOS: 2 days | Discharge: LEFT BEFORE DISCHARGE | DRG: 313 | End: 2019-09-04
Attending: INTERNAL MEDICINE | Admitting: INTERNAL MEDICINE
Payer: MEDICAID

## 2019-09-02 VITALS — DIASTOLIC BLOOD PRESSURE: 36 MMHG | SYSTOLIC BLOOD PRESSURE: 108 MMHG

## 2019-09-02 VITALS — BODY MASS INDEX: 21.06 KG/M2 | HEIGHT: 70 IN | WEIGHT: 147.1 LBS

## 2019-09-02 VITALS — DIASTOLIC BLOOD PRESSURE: 48 MMHG | SYSTOLIC BLOOD PRESSURE: 133 MMHG

## 2019-09-02 DIAGNOSIS — Z91.040: ICD-10-CM

## 2019-09-02 DIAGNOSIS — I25.10: ICD-10-CM

## 2019-09-02 DIAGNOSIS — Z91.041: ICD-10-CM

## 2019-09-02 DIAGNOSIS — I71.4: ICD-10-CM

## 2019-09-02 DIAGNOSIS — Z82.49: ICD-10-CM

## 2019-09-02 DIAGNOSIS — F17.290: ICD-10-CM

## 2019-09-02 DIAGNOSIS — R07.89: Primary | ICD-10-CM

## 2019-09-02 DIAGNOSIS — J44.1: ICD-10-CM

## 2019-09-02 DIAGNOSIS — E11.51: ICD-10-CM

## 2019-09-02 DIAGNOSIS — L97.529: ICD-10-CM

## 2019-09-02 DIAGNOSIS — Z88.5: ICD-10-CM

## 2019-09-02 DIAGNOSIS — I11.0: ICD-10-CM

## 2019-09-02 DIAGNOSIS — Z95.5: ICD-10-CM

## 2019-09-02 DIAGNOSIS — F41.9: ICD-10-CM

## 2019-09-02 DIAGNOSIS — E78.5: ICD-10-CM

## 2019-09-02 DIAGNOSIS — I50.32: ICD-10-CM

## 2019-09-02 DIAGNOSIS — Z79.4: ICD-10-CM

## 2019-09-02 DIAGNOSIS — I65.22: ICD-10-CM

## 2019-09-02 DIAGNOSIS — Z98.1: ICD-10-CM

## 2019-09-02 DIAGNOSIS — I25.2: ICD-10-CM

## 2019-09-02 DIAGNOSIS — Z79.02: ICD-10-CM

## 2019-09-02 DIAGNOSIS — Z79.82: ICD-10-CM

## 2019-09-02 DIAGNOSIS — Z79.899: ICD-10-CM

## 2019-09-02 DIAGNOSIS — E78.00: ICD-10-CM

## 2019-09-02 DIAGNOSIS — E11.621: ICD-10-CM

## 2019-09-02 DIAGNOSIS — Z88.8: ICD-10-CM

## 2019-09-02 DIAGNOSIS — Z88.0: ICD-10-CM

## 2019-09-02 LAB
%HYPO/RBC NFR BLD AUTO: (no result) %
ABSOLUTE BASOPHILS: 0.1 THOU/UL (ref 0–0.2)
ABSOLUTE EOSINOPHILS: 0.3 THOU/UL (ref 0–0.7)
ABSOLUTE MONOCYTES: 0.5 THOU/UL (ref 0–1.2)
ALBUMIN SERPL-MCNC: 3.1 G/DL (ref 3.4–5)
ALP SERPL-CCNC: 113 U/L (ref 46–116)
ALT SERPL-CCNC: 18 U/L (ref 30–65)
ANION GAP SERPL CALC-SCNC: 8 MMOL/L (ref 7–16)
ANISOCYTOSIS BLD QL SMEAR: (no result)
APTT BLD: 24.1 SECONDS (ref 25–31.3)
AST SERPL-CCNC: 9 U/L (ref 15–37)
BASOPHILS NFR BLD AUTO: 1.5 %
BILIRUB SERPL-MCNC: 0.2 MG/DL
BUN SERPL-MCNC: 10 MG/DL (ref 7–18)
CALCIUM SERPL-MCNC: 8.6 MG/DL (ref 8.5–10.1)
CHLORIDE SERPL-SCNC: 102 MMOL/L (ref 98–107)
CK-MB MASS: 4.4 NG/ML
CO2 SERPL-SCNC: 27 MMOL/L (ref 21–32)
CREAT SERPL-MCNC: 0.9 MG/DL (ref 0.6–1.3)
EOSINOPHIL NFR BLD: 3.3 %
GIANT PLATELETS BLD QL SMEAR: (no result)
GLUCOSE SERPL-MCNC: 121 MG/DL (ref 70–99)
GRANULOCYTES NFR BLD MANUAL: 56.8 %
HCT VFR BLD CALC: 37.4 % (ref 42–52)
HGB BLD-MCNC: 11.6 GM/DL (ref 14–18)
INR PPP: 1
LIPASE: 101 U/L (ref 73–393)
LYMPHOCYTES # BLD: 3.1 THOU/UL (ref 0.8–5.3)
LYMPHOCYTES NFR BLD AUTO: 33 %
MAGNESIUM SERPL-MCNC: 1.5 MG/DL (ref 1.8–2.4)
MCH RBC QN AUTO: 22.7 PG (ref 26–34)
MCHC RBC AUTO-ENTMCNC: 31 G/DL (ref 28–37)
MCV RBC: 73.1 FL (ref 80–100)
MICROCYTES: (no result)
MONOCYTES NFR BLD: 5.4 %
MPV: 7.2 FL. (ref 7.2–11.1)
NEUTROPHILS # BLD: 5.4 THOU/UL (ref 1.6–8.1)
NT-PRO BRAIN NAT PEPTIDE: 1483 PG/ML (ref ?–300)
NUCLEATED RBCS: 0 /100WBC
PLATELET # BLD EST: (no result) 10*3/UL
PLATELET COUNT*: 534 THOU/UL (ref 150–400)
POTASSIUM SERPL-SCNC: 3.9 MMOL/L (ref 3.5–5.1)
PROT SERPL-MCNC: 6.7 G/DL (ref 6.4–8.2)
PROTHROMBIN TIME: 10.4 SECONDS (ref 9.2–11.5)
RBC # BLD AUTO: 5.12 MIL/UL (ref 4.5–6)
RDW-CV: 23.2 % (ref 10.5–14.5)
SODIUM SERPL-SCNC: 137 MMOL/L (ref 136–145)
TROPONIN-I LEVEL: <0.06 NG/ML (ref ?–0.06)
WBC # BLD AUTO: 9.5 THOU/UL (ref 4–11)

## 2019-09-02 NOTE — NUR
VIA REPORT FROM OFF GOING RN. PATIENT HAD MULTIPLE ATTEMPTS TO ESTABLISH IV 
ACCESS WITH OUT SUCCESS. DR GLEASON HAD ATTEMPTED WITH SONOGRAM WITH OUT
SUCCESS.

## 2019-09-02 NOTE — EKG
[place] [address]
Phone:  [phone]                     ELECTROCARDIOGRAM REPORT      
_______________________________________________________________________________
 
Name:       DANDRE SCHULTZ                  Room:                      [facility]#:  [id number]      Account #:      [id number]  
Admission:  19     Attend Phys:                         
Discharge:  19     Date of Birth:  [birth year]  
         Report #: [id number]
    [id number]
_______________________________________________________________________________
THIS REPORT FOR:  //name//                      
 
                         [facility] ED
                                       
Test Date:    2019               Test Time:    13:49:25
Pat Name:     DANDRE SCHULTZ              Department:   
Patient ID:   [id number]            Room:          
Gender:       M                        Technician:   JAMES
:          1955               Requested By: Sandi Sheridan
Order Number: [id number] MD:   Miguel Angel Hoff
                                 Measurements
Intervals                              Axis          
Rate:         102                      P:            77
TX:           169                      QRS:          -30
QRSD:         141                      T:            123
QT:           386                                    
QTc:          503                                    
                           Interpretive Statements
Sinus tachycardia
Ventricular premature complex
Left bundle branch block
Compared to ECG 2019 08:09:35
Sinus rhythm no longer present
 
Electronically Signed On 2019 7:39:50 CDT by Miguel Angel Hoff
[email or web address]
 
 
 
 
 
 
 
 
 
 
 
 
 
 
 
 
 
  <ELECTRONICALLY SIGNED>
                                           By: Miguel Angel Hoff MD, [facility]      
  19     0739
D: 19 1349   _____________________________________
T: 19   Miguel Angel Hoff MD, FACC        /EPI

## 2019-09-03 VITALS — SYSTOLIC BLOOD PRESSURE: 96 MMHG | DIASTOLIC BLOOD PRESSURE: 44 MMHG

## 2019-09-03 VITALS — SYSTOLIC BLOOD PRESSURE: 104 MMHG | DIASTOLIC BLOOD PRESSURE: 57 MMHG

## 2019-09-03 VITALS — DIASTOLIC BLOOD PRESSURE: 54 MMHG | SYSTOLIC BLOOD PRESSURE: 108 MMHG

## 2019-09-03 VITALS — SYSTOLIC BLOOD PRESSURE: 95 MMHG | DIASTOLIC BLOOD PRESSURE: 41 MMHG

## 2019-09-03 VITALS — SYSTOLIC BLOOD PRESSURE: 110 MMHG | DIASTOLIC BLOOD PRESSURE: 63 MMHG

## 2019-09-03 VITALS — SYSTOLIC BLOOD PRESSURE: 110 MMHG | DIASTOLIC BLOOD PRESSURE: 58 MMHG

## 2019-09-03 NOTE — EKG
[place] [address]
Phone:  [phone]                     ELECTROCARDIOGRAM REPORT      
_______________________________________________________________________________
 
Name:       DANDRE SCHULTZ                  Room:           [address]    ADM IN  
M.R.#:  [id number]      Account #:      [id number]  
Admission:  19     Attend Phys:    SALONI Ross
Discharge:               Date of Birth:  [birth year]  
         Report #: [id number]
    [id number]
_______________________________________________________________________________
THIS REPORT FOR:  //name//                      
 
                          [facility]
                                       
Test Date:    2019               Test Time:    00:26:46
Pat Name:     DANDRE SCHULTZ              Department:   
Patient ID:   [id number]            Room:         [address]
Gender:       M                        Technician:   [id number]
:          1955               Requested By: Pb Summers
Order Number: [id number]    Wai MD:   James Harrison
                                 Measurements
Intervals                              Axis          
Rate:         81                       P:            79
MT:           185                      QRS:          -50
QRSD:         142                      T:            110
QT:           425                                    
QTc:          494                                    
                           Interpretive Statements
Sinus rhythm
Multiple ventricular premature complexes
Nonspecific IVCD with LAD
Anterior Q waves, possibly due to LVH
Baseline wander in lead(s) V4
Compared to ECG 2019 13:49:25
Left ventricular hypertrophy now present
Early repolarization now present
Q waves now present
Sinus tachycardia no longer present
 
Electronically Signed On 9-3-2019 16:20:37 CDT by James Harrison
[email or web address]
 
 
 
 
 
 
 
 
 
 
 
 
  <ELECTRONICALLY SIGNED>
                                           By: James Harrison MD, [facility]     
  19     1620
D: 19 0026   _____________________________________
T: 19 0026   James Harrison MD, [facility]       /EPI

## 2019-09-03 NOTE — NUR
RECEIVED REPORT FROM ED RN. PT TRANSFERRED TO . PT A&OX4. VSS. CARDIAC
MONITOR IN PLACE. PT ON O2 AT 2L NC. PT TRACING SR BBB PVC'S ON TELE. PT
UPSTANDBY TO RESTROOM. PT COMPLAINED OF CHEST PAIN- DR العلي MADE AWARE WITH
NEW ORDERS. MEDS GIVEN PER MAR. ORIENTED TO ROOM & CALL LIGHT. FALL PRECAUTION
OBSERVED. CALL LIGHT WITHIN REACH.

## 2019-09-03 NOTE — EKG
[place] [address]
Phone:  [phone]                     ELECTROCARDIOGRAM REPORT      
_______________________________________________________________________________
 
Name:       DANDRE SCHULTZ                  Room:           [address]    ADM IN  
.R.#:  [id number]      Account #:      [id number]  
Admission:  19     Attend Phys:    SALONI Ross
Discharge:               Date of Birth:  [birth year]  
         Report #: [id number]
    [id number]
_______________________________________________________________________________
THIS REPORT FOR:  //name//                      
 
                         [facility] ED
                                       
Test Date:    2019               Test Time:    17:53:52
Pat Name:     DANDRE SCHULTZ              Department:   
Patient ID:   [id number]            Room:         [facility]
Gender:       M                        Technician:   CD
:          1955               Requested By: Enrique West
Order Number: [id number] MD:   James Harrison
                                 Measurements
Intervals                              Axis          
Rate:         93                       P:            77
SD:           169                      QRS:          -62
QRSD:         137                      T:            115
QT:           391                                    
QTc:          487                                    
                           Interpretive Statements
Sinus rhythm
Multiple ventricular premature complexes
Left bundle branch block
Compared to ECG 2019 13:49:25
Sinus tachycardia no longer present
 
Electronically Signed On 9-3-2019 16:16:51 CDT by James Harrison
[email or web address]
 
 
 
 
 
 
 
 
 
 
 
 
 
 
 
 
 
  <ELECTRONICALLY SIGNED>
                                           By: James Harrison MD, [facility]     
  19     1616
D: 19 1753   _____________________________________
T: 19 1753   James Harrison MD, [facility]       /EPI

## 2019-09-03 NOTE — NUR
PT A/O, BUT HARD TO GET INFORMATION FROM. TELE TRACKING SR/BBB AND ALL VSS ON
3L. C/O CHEST PAIN 8/10 UNRELIEVED BY MORPHINE- PROVIDERS AWARE. DENIES N/V.
BED ALARM ON. EDUCATED ON SAFETY AND PLAN OF CARE. PLEASE SEE ASSESSMENT FOR
ADDITIONAL INFORMATION. WILL CONT TO MONITOR

## 2019-09-03 NOTE — NUR
CM COMPLETED INITIAL ASSESSMENT. PT ALERT AND ORIENTED TO DISCUSS ROLE OF CM
AND D/C PLANNING.  PT SEEMINGLY AGITATED, AS EVIDENCED BY, REFUSING TO DISCUSS
CURRENT LIVING SITUATION. STATED, "WHAT DOES THAT HAVE TO DO WITH ANYTHING.
THATS NOT WHAT I'M HERE FOR." CM REINTERATED ROLE. PT STATED HE WANATED TO
LIVE AT [place], BUT KNOWS HE HAS BEEN DENIED, STATES, "I GO WHER THE BODY
TAKES ME." CM ASKED IF PT HAD CONSIDERED OTHER LTC OPTIONS. PT STATED HE
DOESNT REMEMBER CHOICES PROVIDED IN THE PAST. CM GAVE PT LIST OF FACILITIES TO
REVIEW. CM TO CONT TO FOLLOW PT TO ASSIST AS NEEDED.

## 2019-09-04 VITALS — SYSTOLIC BLOOD PRESSURE: 118 MMHG | DIASTOLIC BLOOD PRESSURE: 62 MMHG

## 2019-09-04 VITALS — DIASTOLIC BLOOD PRESSURE: 64 MMHG | SYSTOLIC BLOOD PRESSURE: 112 MMHG

## 2019-09-04 VITALS — SYSTOLIC BLOOD PRESSURE: 117 MMHG | DIASTOLIC BLOOD PRESSURE: 71 MMHG

## 2019-09-04 VITALS — SYSTOLIC BLOOD PRESSURE: 118 MMHG | DIASTOLIC BLOOD PRESSURE: 85 MMHG

## 2019-09-04 LAB
ALBUMIN SERPL-MCNC: 2.6 G/DL (ref 3.4–5)
ALP SERPL-CCNC: 98 U/L (ref 46–116)
ALT SERPL-CCNC: 15 U/L (ref 30–65)
ANION GAP SERPL CALC-SCNC: 8 MMOL/L (ref 7–16)
AST SERPL-CCNC: 8 U/L (ref 15–37)
BILIRUB SERPL-MCNC: 0.2 MG/DL
BUN SERPL-MCNC: 16 MG/DL (ref 7–18)
CALCIUM SERPL-MCNC: 8.6 MG/DL (ref 8.5–10.1)
CHLORIDE SERPL-SCNC: 100 MMOL/L (ref 98–107)
CO2 SERPL-SCNC: 25 MMOL/L (ref 21–32)
CREAT SERPL-MCNC: 0.9 MG/DL (ref 0.6–1.3)
EST. AVERAGE GLUCOSE BLD GHB EST-MCNC: 266 MG/DL
GLUCOSE SERPL-MCNC: 442 MG/DL (ref 70–99)
GLYCOHEMOGLOBIN (HGB A1C): 10.9 % (ref 4.8–5.6)
HCT VFR BLD CALC: 37 % (ref 42–52)
HGB BLD-MCNC: 11.2 GM/DL (ref 14–18)
MCH RBC QN AUTO: 22.6 PG (ref 26–34)
MCHC RBC AUTO-ENTMCNC: 30.2 G/DL (ref 28–37)
MCV RBC: 74.6 FL (ref 80–100)
MPV: 7.4 FL. (ref 7.2–11.1)
PLATELET COUNT*: 470 THOU/UL (ref 150–400)
POTASSIUM SERPL-SCNC: 5.1 MMOL/L (ref 3.5–5.1)
PROT SERPL-MCNC: 6 G/DL (ref 6.4–8.2)
RBC # BLD AUTO: 4.95 MIL/UL (ref 4.5–6)
RDW-CV: 23.7 % (ref 10.5–14.5)
SODIUM SERPL-SCNC: 133 MMOL/L (ref 136–145)
WBC # BLD AUTO: 7.6 THOU/UL (ref 4–11)

## 2019-09-04 NOTE — NUR
VSS, ASSUMED CARE IN THE AM, ASSESSMENT PERFORMED AND CHARTED, FALL
PRECAUTIONS IN PLACE AND CALL LIGHT IN REACH, PT IS A&O3, BUT FORGETFUL, PT
STATES HE WANTS TO LEAVE, PT REFUSES TO COMPLETE STRESS TEST, AT THIS TIME HE
HAS SINGED AMA PAPERS AND IV AND TELE MONITOR TAKENB OUT. HOURLY ROUNDS
COMPLETED AND WILL FOLLOW WITH PLAN OF CARE, I HAVE CONTATCTED DR. RIZO. PT
WALKED OUT THROUGH ER.

## 2019-09-04 NOTE — NUR
WOUND CARE NOTE:  CONSULT RECEIVED FOR LEFT FOOT DIABETIC ULCER.
 
PATIENT PRESENTS WITH A FULL THICKNESS ULCERATION TO THE LEFT 4TH TOE.
PATIENT WAS ASSESSED ALONG SIDE HOSPITALIST AND VASCULAR NP.  PATIENT
ADEMENTLY REFUSES ANY CARE TO THIS WOUND, STATES IT IS FINE AND THAT DR. GLEASON
DOES NOT WANT ANY OTHER DOCTOR MESSING WITH IT.  PATIENT'S RN ATTEMPTED TO
TAKE PHOTO OF AREA AND PATIENT REFUSED EVEN AFTER EDUCATION.  FROM VISUAL
ASSESSMENT, 4TH TOE IS INFLAMMED, EDEMATOUS WITH AN ULCERATION TO THE DORSAL
ASPECT.  APPROXIMATELY 0.5X0.5 IN SIZE, BUT UNABLE TO DETERMINE DEPTH.  DRIED
SEROSANGUINEOUS DRAINAGE PRESENT TO ERICK-WOUND.  NP WAS ABLE TO DOPPLE PEDAL
PULSES BILATERALLY.
 
RECOMMEND
CONTINUE FOLLOWING UP WITH DR. GLEASON
ENCOURAGE GOOD NUTRTION/HYDRATION
TIGHT BLOOD GLUCOSE CONTROL
WOULD LIKE TO PACK WOUND IF PATIENT IS EVER AGREEABLE TO CARES FOR THE WOUND

## 2019-09-04 NOTE — CARDNUC
[place] [address]
Phone:  [phone]                     CARDIAC NUCLEAR IMAGING REPORT
_______________________________________________________________________________
 
Name:         DANDRE SCHULTZ                 Room:          [address] [place] 
M.R.#:    [id number]     Account #:     [id number]  
Admission:    09/02/19    Attend Phys:   Pb Summers
Discharge:    09/04/19    Date of Birth: [birth year]  
Date of Service: 09/04/19 1802  Report #:      [id number]
        [id number] 
_______________________________________________________________________________
THIS REPORT FOR:  //name//                      
 
 
--------------- APPROVED REPORT --------------
 
 
Imaging Protocol: incomplete with acquisition of resting images 
only
Study performed:  09/03/2019 15:15:00
 
NM Tech:LOR Gonzalez
 
  BMI:  0
 
Resting Data
Rest SPECT myocardial perfusion imaging was performed in supine 
position 30 minutes following the intravenous injection of 9.0 mCi of 
Tc-99m Sestamibi.
Time of rest injection: 1250     Date: 09/04/2019
Administration Route: IV
 
Stress Test Details
Stress Test:  the stress portion of this test was not obtained as 
patient refused follow-up.      
 
HR      Max Heart Rate (APMHR): 156 bpm  
      Target HR (85% APMHR): 132 bpm  
 
BP           
ECG           
 
Perfusion
Resting images were obtained that show a moderate to large size 
moderate to severe intensity defect involving the inferior and 
inferoapical wall. Stress images were not obtained for 
comparison.
 
Wall Motion
The study does not include gated images. No comment on wall 
motion.
 
Nuclear Conclusion
This is an incomplete resting only study. The resting images suggest 
 
 
[place] [address]
Phone:  [phone]                     CARDIAC NUCLEAR IMAGING REPORT
_______________________________________________________________________________
 
Name:         DANDRE SCHULTZ                 Room:          [address] [place] 
M.R.#:    [id number]     Account #:     [id number]  
Admission:    09/02/19    Attend Phys:   Pb Summers
Discharge:    09/04/19    Date of Birth: [birth year]  
Date of Service: 09/04/19 1802  Report #:      [id number]
        [id number] 
_______________________________________________________________________________
a defect involving the inferior to the inferoapical wall. The study 
is incomplete. No stress images were obtained for comparison.
 
 
 
 
 
 
 
 
 
 
 
 
 
 
 
 
 
 
 
 
 
 
 
 
 
 
 
 
 
 
 
 
 
 
 
 
 
 
 
 
 
 
  <ELECTRONICALLY SIGNED>
                                           By: Obie Daniels MD, FACC   
  09/04/19 1802
D: 09/04/19 1802   _____________________________________
T: 09/04/19 1802   Obie Daniels MD, FACC     /INF

## 2019-09-04 NOTE — NUR
PT IS ABLE TO COMMUNICATE HIS NEEDS TO STAFF WITH SOME MINOR DIFFICULTY; HE IS
SOMETIMES VERY RUDE AND IS UNCOOPERATIVE. CURRENT PAIN MEDICATION REGIMEN HAS
BEEN ADEQUATE FOR CONTROLLING HIS PAIN UP TO THIS TIME. HE WILL BE NPO, EXCEPT
FOR WATER AND MEDS, AT 0900 TODAY FOR A STRESS TEST. AS NOTED EARLIER, HE DID
REFUSE MEDS LAST EVENING. PT ALSO TENTATIVELY SCHEDULED TO HAVE A CTA CHEST
LATER TODAY.

## 2019-10-07 ENCOUNTER — HOSPITAL ENCOUNTER (EMERGENCY)
Dept: HOSPITAL 96 - M.ERS | Age: 64
Discharge: LEFT BEFORE DISCHARGE | End: 2019-10-07
Payer: MEDICAID

## 2019-10-07 VITALS — DIASTOLIC BLOOD PRESSURE: 66 MMHG | SYSTOLIC BLOOD PRESSURE: 111 MMHG

## 2019-10-07 VITALS — BODY MASS INDEX: 20.84 KG/M2 | WEIGHT: 145.59 LBS | HEIGHT: 70 IN

## 2019-10-07 DIAGNOSIS — Z88.4: ICD-10-CM

## 2019-10-07 DIAGNOSIS — Z91.041: ICD-10-CM

## 2019-10-07 DIAGNOSIS — E78.5: ICD-10-CM

## 2019-10-07 DIAGNOSIS — E11.9: ICD-10-CM

## 2019-10-07 DIAGNOSIS — Z88.5: ICD-10-CM

## 2019-10-07 DIAGNOSIS — Z88.6: ICD-10-CM

## 2019-10-07 DIAGNOSIS — I11.0: ICD-10-CM

## 2019-10-07 DIAGNOSIS — J44.9: ICD-10-CM

## 2019-10-07 DIAGNOSIS — R07.89: Primary | ICD-10-CM

## 2019-10-07 DIAGNOSIS — I50.30: ICD-10-CM

## 2019-10-07 DIAGNOSIS — Z88.8: ICD-10-CM

## 2019-10-07 DIAGNOSIS — Z88.0: ICD-10-CM

## 2019-10-07 DIAGNOSIS — F17.210: ICD-10-CM

## 2019-10-07 DIAGNOSIS — Z91.040: ICD-10-CM

## 2019-10-07 DIAGNOSIS — I25.10: ICD-10-CM

## 2019-10-07 DIAGNOSIS — E78.00: ICD-10-CM

## 2019-10-07 LAB
ABSOLUTE BASOPHILS: 0 THOU/UL (ref 0–0.2)
ABSOLUTE EOSINOPHILS: 0.3 THOU/UL (ref 0–0.7)
ABSOLUTE MONOCYTES: 0.3 THOU/UL (ref 0–1.2)
ALBUMIN SERPL-MCNC: 3.1 G/DL (ref 3.4–5)
ALP SERPL-CCNC: 115 U/L (ref 46–116)
ALT SERPL-CCNC: 18 U/L (ref 30–65)
ANION GAP SERPL CALC-SCNC: 10 MMOL/L (ref 7–16)
ANISOCYTOSIS BLD QL SMEAR: (no result)
APTT BLD: 19 SECONDS (ref 25–31.3)
AST SERPL-CCNC: 10 U/L (ref 15–37)
BASOPHILS NFR BLD AUTO: 0.4 %
BILIRUB SERPL-MCNC: < 0.1 MG/DL
BUN SERPL-MCNC: 7 MG/DL (ref 7–18)
CALCIUM SERPL-MCNC: 9.2 MG/DL (ref 8.5–10.1)
CHLORIDE SERPL-SCNC: 99 MMOL/L (ref 98–107)
CO2 SERPL-SCNC: 27 MMOL/L (ref 21–32)
CREAT SERPL-MCNC: 0.9 MG/DL (ref 0.6–1.3)
EOSINOPHIL NFR BLD: 3.8 %
GLUCOSE SERPL-MCNC: 364 MG/DL (ref 70–99)
GRANULOCYTES NFR BLD MANUAL: 59.8 %
HCT VFR BLD CALC: 36.7 % (ref 42–52)
HGB BLD-MCNC: 11.6 GM/DL (ref 14–18)
INR PPP: 1
LIPASE: 84 U/L (ref 73–393)
LYMPHOCYTES # BLD: 2.1 THOU/UL (ref 0.8–5.3)
LYMPHOCYTES NFR BLD AUTO: 31.6 %
MCH RBC QN AUTO: 23.4 PG (ref 26–34)
MCHC RBC AUTO-ENTMCNC: 31.5 G/DL (ref 28–37)
MCV RBC: 74.2 FL (ref 80–100)
MICROCYTES: (no result)
MONOCYTES NFR BLD: 4.4 %
MPV: 7.4 FL. (ref 7.2–11.1)
NEUTROPHILS # BLD: 4 THOU/UL (ref 1.6–8.1)
NT-PRO BRAIN NAT PEPTIDE: 1070 PG/ML (ref ?–300)
NUCLEATED RBCS: 0 /100WBC
PLATELET # BLD EST: (no result) 10*3/UL
PLATELET COUNT*: 418 THOU/UL (ref 150–400)
POIKILOCYTOSIS BLD QL SMEAR: (no result)
POTASSIUM SERPL-SCNC: 3.9 MMOL/L (ref 3.5–5.1)
PROT SERPL-MCNC: 6.5 G/DL (ref 6.4–8.2)
PROTHROMBIN TIME: 10.2 SECONDS (ref 9.2–11.5)
RBC # BLD AUTO: 4.95 MIL/UL (ref 4.5–6)
RDW-CV: 20.9 % (ref 10.5–14.5)
SODIUM SERPL-SCNC: 136 MMOL/L (ref 136–145)
TROPONIN-I LEVEL: <0.06 NG/ML (ref ?–0.06)
WBC # BLD AUTO: 6.8 THOU/UL (ref 4–11)

## 2019-10-08 NOTE — EKG
[place] [address]
Phone:  [phone]                     ELECTROCARDIOGRAM REPORT      
_______________________________________________________________________________
 
Name:       DANDRE SCHULTZ                  Room:                      [facility]#:  [id number]      Account #:      [id number]  
Admission:  10/07/19     Attend Phys:                         
Discharge:  10/07/19     Date of Birth:  [birth year]  
         Report #: [id number]
    [id number]
_______________________________________________________________________________
THIS REPORT FOR:  //name//                      
 
                         [facility] ED
                                       
Test Date:    2019-10-07               Test Time:    16:13:29
Pat Name:     DANDRE SCHULTZ              Department:   
Patient ID:   [id number]            Room:          
Gender:       M                        Technician:   AL
:          1955               Requested By: Romina Gonzales
Order Number: [id number] MD:   Obie Daniels
                                 Measurements
Intervals                              Axis          
Rate:         81                       P:            74
SD:           180                      QRS:          -8
QRSD:         147                      T:            166
QT:           425                                    
QTc:          494                                    
                           Interpretive Statements
Sinus rhythm
Left bundle branch block
Compared to ECG 2019 00:26:46
Left bundle-branch block now present
Ventricular premature complex(es) no longer present
Intraventricular conduction delay no longer present
Left ventricular hypertrophy no longer present
Q waves no longer present
 
Electronically Signed On 10-8-2019 11:07:10 CDT by Obie Daniels
[email or web address]
 
 
 
 
 
 
 
 
 
 
 
 
 
 
  <ELECTRONICALLY SIGNED>
                                           By: Obie Daniels MD, FACC   
  10/08/19     1107
D: 10/07/19 1613   _____________________________________
T: 10/07/19 1613   Obie Daniels MD, FAC     /EPI

## 2019-12-20 ENCOUNTER — HOSPITAL ENCOUNTER (INPATIENT)
Dept: HOSPITAL 96 - M.ERS | Age: 64
Discharge: LEFT BEFORE DISCHARGE | DRG: 302 | End: 2019-12-20
Attending: INTERNAL MEDICINE | Admitting: INTERNAL MEDICINE
Payer: MEDICAID

## 2019-12-20 VITALS — SYSTOLIC BLOOD PRESSURE: 110 MMHG | DIASTOLIC BLOOD PRESSURE: 74 MMHG

## 2019-12-20 VITALS — DIASTOLIC BLOOD PRESSURE: 56 MMHG | SYSTOLIC BLOOD PRESSURE: 125 MMHG

## 2019-12-20 VITALS — DIASTOLIC BLOOD PRESSURE: 76 MMHG | SYSTOLIC BLOOD PRESSURE: 158 MMHG

## 2019-12-20 VITALS — WEIGHT: 147 LBS | HEIGHT: 70 IN | BODY MASS INDEX: 21.05 KG/M2

## 2019-12-20 DIAGNOSIS — Z79.82: ICD-10-CM

## 2019-12-20 DIAGNOSIS — Z88.8: ICD-10-CM

## 2019-12-20 DIAGNOSIS — I25.110: Primary | ICD-10-CM

## 2019-12-20 DIAGNOSIS — Z98.1: ICD-10-CM

## 2019-12-20 DIAGNOSIS — Z86.711: ICD-10-CM

## 2019-12-20 DIAGNOSIS — I11.0: ICD-10-CM

## 2019-12-20 DIAGNOSIS — Z95.5: ICD-10-CM

## 2019-12-20 DIAGNOSIS — I44.7: ICD-10-CM

## 2019-12-20 DIAGNOSIS — Z53.29: ICD-10-CM

## 2019-12-20 DIAGNOSIS — Z82.49: ICD-10-CM

## 2019-12-20 DIAGNOSIS — Z88.0: ICD-10-CM

## 2019-12-20 DIAGNOSIS — Z79.899: ICD-10-CM

## 2019-12-20 DIAGNOSIS — E11.51: ICD-10-CM

## 2019-12-20 DIAGNOSIS — Z89.422: ICD-10-CM

## 2019-12-20 DIAGNOSIS — Z91.14: ICD-10-CM

## 2019-12-20 DIAGNOSIS — Z79.4: ICD-10-CM

## 2019-12-20 DIAGNOSIS — I25.2: ICD-10-CM

## 2019-12-20 DIAGNOSIS — Z91.041: ICD-10-CM

## 2019-12-20 DIAGNOSIS — E78.5: ICD-10-CM

## 2019-12-20 DIAGNOSIS — F17.210: ICD-10-CM

## 2019-12-20 DIAGNOSIS — I50.33: ICD-10-CM

## 2019-12-20 DIAGNOSIS — Z91.040: ICD-10-CM

## 2019-12-20 DIAGNOSIS — J44.9: ICD-10-CM

## 2019-12-20 DIAGNOSIS — E78.00: ICD-10-CM

## 2019-12-20 LAB
%HYPO/RBC NFR BLD AUTO: (no result) %
ABSOLUTE BASOPHILS: 0 THOU/UL (ref 0–0.2)
ABSOLUTE EOSINOPHILS: 0.4 THOU/UL (ref 0–0.7)
ABSOLUTE MONOCYTES: 0.4 THOU/UL (ref 0–1.2)
ALBUMIN SERPL-MCNC: 2.9 G/DL (ref 3.4–5)
ALP SERPL-CCNC: 106 U/L (ref 46–116)
ALT SERPL-CCNC: 16 U/L (ref 30–65)
ANION GAP SERPL CALC-SCNC: 6 MMOL/L (ref 7–16)
ANISOCYTOSIS BLD QL SMEAR: (no result)
APTT BLD: 28 SECONDS (ref 25–31.3)
AST SERPL-CCNC: 10 U/L (ref 15–37)
BASOPHILS NFR BLD AUTO: 0.1 %
BILIRUB SERPL-MCNC: 0.2 MG/DL
BUN SERPL-MCNC: 6 MG/DL (ref 7–18)
CALCIUM SERPL-MCNC: 8.8 MG/DL (ref 8.5–10.1)
CHLORIDE SERPL-SCNC: 102 MMOL/L (ref 98–107)
CO2 SERPL-SCNC: 29 MMOL/L (ref 21–32)
CREAT SERPL-MCNC: 0.8 MG/DL (ref 0.6–1.3)
EOSINOPHIL NFR BLD: 4.5 %
GLUCOSE SERPL-MCNC: 286 MG/DL (ref 70–99)
GRANULOCYTES NFR BLD MANUAL: 68.4 %
HCT VFR BLD CALC: 32.3 % (ref 42–52)
HGB BLD-MCNC: 10.1 GM/DL (ref 14–18)
INR PPP: 1.1
LIPASE: 61 U/L (ref 73–393)
LYMPHOCYTES # BLD: 1.8 THOU/UL (ref 0.8–5.3)
LYMPHOCYTES NFR BLD AUTO: 22.5 %
MCH RBC QN AUTO: 22.9 PG (ref 26–34)
MCHC RBC AUTO-ENTMCNC: 31.1 G/DL (ref 28–37)
MCV RBC: 73.7 FL (ref 80–100)
MICROCYTES: (no result)
MONOCYTES NFR BLD: 4.5 %
MPV: 7.2 FL. (ref 7.2–11.1)
NEUTROPHILS # BLD: 5.6 THOU/UL (ref 1.6–8.1)
NT-PRO BRAIN NAT PEPTIDE: 1129 PG/ML (ref ?–300)
NUCLEATED RBCS: 0 /100WBC
PLATELET COUNT*: 452 THOU/UL (ref 150–400)
POTASSIUM SERPL-SCNC: 3.9 MMOL/L (ref 3.5–5.1)
PROT SERPL-MCNC: 6.6 G/DL (ref 6.4–8.2)
PROTHROMBIN TIME: 10.8 SECONDS (ref 9.2–11.5)
RBC # BLD AUTO: 4.39 MIL/UL (ref 4.5–6)
RDW-CV: 18.8 % (ref 10.5–14.5)
SODIUM SERPL-SCNC: 137 MMOL/L (ref 136–145)
WBC # BLD AUTO: 8.2 THOU/UL (ref 4–11)

## 2019-12-20 NOTE — EKG
[place] [address]
Phone:  [phone]                     ELECTROCARDIOGRAM REPORT      
_______________________________________________________________________________
 
Name:       DANDRE SCHULTZ                  Room:           [place] [address]   ADM IN  
.R.#:  [id number]      Account #:      [id number]  
Admission:  19     Attend Phys:    SALONI Ross
Discharge:               Date of Birth:  [birth year]  
         Report #: [id number]
    [id number]
_______________________________________________________________________________
THIS REPORT FOR:  //name//                      
 
                         [facility] ED
                                       
Test Date:    2019               Test Time:    12:07:43
Pat Name:     DANDRE SCHULTZ              Department:   
Patient ID:   [id number]            Room:         [facility]
Gender:       M                        Technician:   
:          1955               Requested By: Romina Gonzales
Order Number: [id number] MD:   James Harrison
                                 Measurements
Intervals                              Axis          
Rate:         94                       P:            76
MI:           193                      QRS:          -59
QRSD:         134                      T:            111
QT:           411                                    
QTc:          515                                    
                           Interpretive Statements
Sinus rhythm
Left bundle branch block
Compared to ECG 10/07/2019 16:13:29
No significant changes
 
Electronically Signed On 2019 15:17:43 CST by James Harrison
[email or web address]
 
 
 
 
 
 
 
 
 
 
 
 
 
 
 
 
 
 
  <ELECTRONICALLY SIGNED>
                                           By: James Harrison MD, [facility]     
  19     1517
D: 19 120   _____________________________________
T: 19   James Harrison MD, FACC       /EPI

## 2020-01-03 ENCOUNTER — HOSPITAL ENCOUNTER (EMERGENCY)
Dept: HOSPITAL 96 - M.ERS | Age: 65
Discharge: LEFT BEFORE DISCHARGE | End: 2020-01-03
Payer: MEDICAID

## 2020-01-03 VITALS — SYSTOLIC BLOOD PRESSURE: 125 MMHG | DIASTOLIC BLOOD PRESSURE: 63 MMHG

## 2020-01-03 VITALS — WEIGHT: 150 LBS | HEIGHT: 70 IN | BODY MASS INDEX: 21.47 KG/M2

## 2020-01-03 DIAGNOSIS — F17.210: ICD-10-CM

## 2020-01-03 DIAGNOSIS — I11.0: ICD-10-CM

## 2020-01-03 DIAGNOSIS — Z79.4: ICD-10-CM

## 2020-01-03 DIAGNOSIS — Z88.0: ICD-10-CM

## 2020-01-03 DIAGNOSIS — E11.9: ICD-10-CM

## 2020-01-03 DIAGNOSIS — Z88.8: ICD-10-CM

## 2020-01-03 DIAGNOSIS — I25.10: ICD-10-CM

## 2020-01-03 DIAGNOSIS — Z91.040: ICD-10-CM

## 2020-01-03 DIAGNOSIS — R06.02: ICD-10-CM

## 2020-01-03 DIAGNOSIS — I71.4: ICD-10-CM

## 2020-01-03 DIAGNOSIS — E78.5: ICD-10-CM

## 2020-01-03 DIAGNOSIS — Z88.5: ICD-10-CM

## 2020-01-03 DIAGNOSIS — J44.9: ICD-10-CM

## 2020-01-03 DIAGNOSIS — I50.32: ICD-10-CM

## 2020-01-03 DIAGNOSIS — Z79.82: ICD-10-CM

## 2020-01-03 DIAGNOSIS — E78.00: ICD-10-CM

## 2020-01-03 DIAGNOSIS — R07.9: Primary | ICD-10-CM

## 2020-01-06 NOTE — EKG
[place] [address]
Phone:  [phone]                     ELECTROCARDIOGRAM REPORT      
_______________________________________________________________________________
 
Name:       DANDRE SCHULTZ                  Room:                      [facility]#:  [id number]      Account #:      [id number]  
Admission:  20     Attend Phys:                         
Discharge:  20     Date of Birth:  [birth year]  
         Report #: [id number]
    [id number]
_______________________________________________________________________________
THIS REPORT FOR:  //name//                      
 
                         [facility] ED
                                       
Test Date:    2020               Test Time:    12:24:46
Pat Name:     DANDRE SCHULTZ              Department:   
Patient ID:   [id number]            Room:          
Gender:       M                        Technician:   AL
:          1955               Requested By: Romina Gonzales
Order Number: [id number]    Wai MD:   Miguel Angel Hoff
                                 Measurements
Intervals                              Axis          
Rate:         93                       P:            77
SC:           179                      QRS:          -47
QRSD:         137                      T:            142
QT:           403                                    
QTc:          502                                    
                           Interpretive Statements
Sinus rhythm
Left bundle branch block
Compared to ECG 2019 12:07:43
No significant changes
 
Electronically Signed On 2020 15:01:01 CST by Miguel Angel Hoff
[email or web address]
 
 
 
 
 
 
 
 
 
 
 
 
 
 
 
 
 
 
  <ELECTRONICALLY SIGNED>
                                           By: Miguel Angel Hoff MD, [facility]      
  20     1501
D: 20 1224   _____________________________________
T: 20 1224   Miguel Angel Hoff MD, FACC        /EPI

## 2020-02-06 ENCOUNTER — HOSPITAL ENCOUNTER (INPATIENT)
Dept: HOSPITAL 96 - M.ERS | Age: 65
Discharge: LEFT BEFORE DISCHARGE | DRG: 293 | End: 2020-02-06
Attending: INTERNAL MEDICINE | Admitting: INTERNAL MEDICINE
Payer: MEDICAID

## 2020-02-06 VITALS — DIASTOLIC BLOOD PRESSURE: 63 MMHG | SYSTOLIC BLOOD PRESSURE: 130 MMHG

## 2020-02-06 VITALS — HEIGHT: 70 IN | WEIGHT: 145 LBS | BODY MASS INDEX: 20.76 KG/M2

## 2020-02-06 VITALS — DIASTOLIC BLOOD PRESSURE: 52 MMHG | SYSTOLIC BLOOD PRESSURE: 135 MMHG

## 2020-02-06 DIAGNOSIS — Z53.29: ICD-10-CM

## 2020-02-06 DIAGNOSIS — E78.5: ICD-10-CM

## 2020-02-06 DIAGNOSIS — Z95.5: ICD-10-CM

## 2020-02-06 DIAGNOSIS — F17.210: ICD-10-CM

## 2020-02-06 DIAGNOSIS — E78.00: ICD-10-CM

## 2020-02-06 DIAGNOSIS — Z88.0: ICD-10-CM

## 2020-02-06 DIAGNOSIS — Z91.040: ICD-10-CM

## 2020-02-06 DIAGNOSIS — I25.2: ICD-10-CM

## 2020-02-06 DIAGNOSIS — Z88.8: ICD-10-CM

## 2020-02-06 DIAGNOSIS — Z89.429: ICD-10-CM

## 2020-02-06 DIAGNOSIS — Z91.041: ICD-10-CM

## 2020-02-06 DIAGNOSIS — J44.9: ICD-10-CM

## 2020-02-06 DIAGNOSIS — I11.0: Primary | ICD-10-CM

## 2020-02-06 DIAGNOSIS — Z82.49: ICD-10-CM

## 2020-02-06 DIAGNOSIS — I50.33: ICD-10-CM

## 2020-02-06 DIAGNOSIS — I25.10: ICD-10-CM

## 2020-02-06 DIAGNOSIS — E11.9: ICD-10-CM

## 2020-02-06 DIAGNOSIS — Z88.6: ICD-10-CM

## 2020-02-06 LAB
ABSOLUTE BASOPHILS: 0.1 THOU/UL (ref 0–0.2)
ABSOLUTE EOSINOPHILS: 0.4 THOU/UL (ref 0–0.7)
ABSOLUTE MONOCYTES: 0.5 THOU/UL (ref 0–1.2)
ALBUMIN SERPL-MCNC: 3.1 G/DL (ref 3.4–5)
ALP SERPL-CCNC: 115 U/L (ref 46–116)
ALT SERPL-CCNC: 16 U/L (ref 30–65)
ANION GAP SERPL CALC-SCNC: 7 MMOL/L (ref 7–16)
APTT BLD: 23.8 SECONDS (ref 25–31.3)
AST SERPL-CCNC: 12 U/L (ref 15–37)
BASOPHILS NFR BLD AUTO: 1.1 %
BILIRUB SERPL-MCNC: 0.2 MG/DL
BUN SERPL-MCNC: 4 MG/DL (ref 7–18)
CALCIUM SERPL-MCNC: 8.2 MG/DL (ref 8.5–10.1)
CHLORIDE SERPL-SCNC: 103 MMOL/L (ref 98–107)
CO2 SERPL-SCNC: 30 MMOL/L (ref 21–32)
CREAT SERPL-MCNC: 0.7 MG/DL (ref 0.6–1.3)
EOSINOPHIL NFR BLD: 6.8 %
GLUCOSE SERPL-MCNC: 292 MG/DL (ref 70–99)
GRANULOCYTES NFR BLD MANUAL: 44.8 %
HCT VFR BLD CALC: 29.6 % (ref 42–52)
HGB BLD-MCNC: 9.4 GM/DL (ref 14–18)
INR PPP: 1.1
LIPASE: 84 U/L (ref 73–393)
LYMPHOCYTES # BLD: 2.6 THOU/UL (ref 0.8–5.3)
LYMPHOCYTES NFR BLD AUTO: 39.7 %
MAGNESIUM SERPL-MCNC: 1.4 MG/DL (ref 1.8–2.4)
MCH RBC QN AUTO: 21.4 PG (ref 26–34)
MCHC RBC AUTO-ENTMCNC: 31.6 G/DL (ref 28–37)
MCV RBC: 67.7 FL (ref 80–100)
MONOCYTES NFR BLD: 7.6 %
MPV: 7 FL. (ref 7.2–11.1)
NEUTROPHILS # BLD: 3 THOU/UL (ref 1.6–8.1)
NT-PRO BRAIN NAT PEPTIDE: 1214 PG/ML (ref ?–300)
NUCLEATED RBCS: 0 /100WBC
PLATELET COUNT*: 492 THOU/UL (ref 150–400)
POTASSIUM SERPL-SCNC: 4.2 MMOL/L (ref 3.5–5.1)
PROT SERPL-MCNC: 6.6 G/DL (ref 6.4–8.2)
PROTHROMBIN TIME: 10.9 SECONDS (ref 9.2–11.5)
RBC # BLD AUTO: 4.38 MIL/UL (ref 4.5–6)
RDW-CV: 17.8 % (ref 10.5–14.5)
SODIUM SERPL-SCNC: 140 MMOL/L (ref 136–145)
WBC # BLD AUTO: 6.6 THOU/UL (ref 4–11)

## 2020-03-04 ENCOUNTER — HOSPITAL ENCOUNTER (EMERGENCY)
Dept: HOSPITAL 61 - ER | Age: 65
Discharge: LEFT BEFORE DISCHARGE | End: 2020-03-04
Payer: MEDICARE

## 2020-03-04 ENCOUNTER — HOSPITAL ENCOUNTER (EMERGENCY)
Dept: HOSPITAL 96 - M.ERS | Age: 65
Discharge: HOME | End: 2020-03-04
Payer: MEDICAID

## 2020-03-04 VITALS — DIASTOLIC BLOOD PRESSURE: 56 MMHG | SYSTOLIC BLOOD PRESSURE: 108 MMHG

## 2020-03-04 VITALS — HEIGHT: 70 IN | BODY MASS INDEX: 22.95 KG/M2 | WEIGHT: 160.28 LBS

## 2020-03-04 VITALS — HEIGHT: 70 IN | WEIGHT: 160.01 LBS | BODY MASS INDEX: 22.91 KG/M2

## 2020-03-04 VITALS — DIASTOLIC BLOOD PRESSURE: 58 MMHG | SYSTOLIC BLOOD PRESSURE: 122 MMHG

## 2020-03-04 DIAGNOSIS — I11.0: ICD-10-CM

## 2020-03-04 DIAGNOSIS — Z88.0: ICD-10-CM

## 2020-03-04 DIAGNOSIS — Z86.718: ICD-10-CM

## 2020-03-04 DIAGNOSIS — R07.89: Primary | ICD-10-CM

## 2020-03-04 DIAGNOSIS — I25.10: ICD-10-CM

## 2020-03-04 DIAGNOSIS — F17.210: ICD-10-CM

## 2020-03-04 DIAGNOSIS — E11.9: ICD-10-CM

## 2020-03-04 DIAGNOSIS — E11.65: ICD-10-CM

## 2020-03-04 DIAGNOSIS — I50.32: ICD-10-CM

## 2020-03-04 DIAGNOSIS — E83.42: ICD-10-CM

## 2020-03-04 DIAGNOSIS — E78.00: ICD-10-CM

## 2020-03-04 DIAGNOSIS — R42: Primary | ICD-10-CM

## 2020-03-04 DIAGNOSIS — E78.5: ICD-10-CM

## 2020-03-04 DIAGNOSIS — Z98.890: ICD-10-CM

## 2020-03-04 DIAGNOSIS — Z88.5: ICD-10-CM

## 2020-03-04 DIAGNOSIS — F17.200: ICD-10-CM

## 2020-03-04 DIAGNOSIS — J44.9: ICD-10-CM

## 2020-03-04 DIAGNOSIS — Z88.8: ICD-10-CM

## 2020-03-04 DIAGNOSIS — Z91.041: ICD-10-CM

## 2020-03-04 DIAGNOSIS — I50.9: ICD-10-CM

## 2020-03-04 DIAGNOSIS — Z91.040: ICD-10-CM

## 2020-03-04 DIAGNOSIS — I25.2: ICD-10-CM

## 2020-03-04 DIAGNOSIS — I97.821: ICD-10-CM

## 2020-03-04 LAB
% BANDS: 2 % (ref 0–9)
% LYMPHS: 7 % (ref 24–48)
% MONOS: 1 % (ref 0–10)
% SEGS: 87 % (ref 35–66)
%HYPO/RBC NFR BLD AUTO: (no result) %
ABSOLUTE BASOPHILS: 0.2 THOU/UL (ref 0–0.2)
ABSOLUTE EOSINOPHILS: 0.1 THOU/UL (ref 0–0.7)
ABSOLUTE MONOCYTES: 0.4 THOU/UL (ref 0–1.2)
ALBUMIN SERPL-MCNC: 3.1 G/DL (ref 3.4–5)
ALBUMIN SERPL-MCNC: 3.2 G/DL (ref 3.4–5)
ALBUMIN/GLOB SERPL: 1 {RATIO} (ref 1–1.7)
ALP SERPL-CCNC: 106 U/L (ref 46–116)
ALP SERPL-CCNC: 110 U/L (ref 46–116)
ALT SERPL-CCNC: 14 U/L (ref 16–63)
ALT SERPL-CCNC: 17 U/L (ref 30–65)
ANION GAP SERPL CALC-SCNC: 10 MMOL/L (ref 6–14)
ANION GAP SERPL CALC-SCNC: 9 MMOL/L (ref 7–16)
ANISOCYTOSIS BLD QL SMEAR: (no result)
ANISOCYTOSIS BLD QL SMEAR: (no result)
APTT BLD: 23.6 SECONDS (ref 25–31.3)
AST SERPL-CCNC: 10 U/L (ref 15–37)
AST SERPL-CCNC: 13 U/L (ref 15–37)
BASOPHILS # BLD AUTO: 0.1 X10^3/UL (ref 0–0.2)
BASOPHILS NFR BLD AUTO: 1 % (ref 0–3)
BASOPHILS NFR BLD AUTO: 1.7 %
BASOPHILS NFR BLD: 1 % (ref 0–3)
BILIRUB SERPL-MCNC: 0.2 MG/DL
BILIRUB SERPL-MCNC: 0.4 MG/DL (ref 0.2–1)
BUN SERPL-MCNC: 4 MG/DL (ref 7–18)
BUN SERPL-MCNC: 5 MG/DL (ref 8–26)
BUN/CREAT SERPL: 7 (ref 6–20)
CALCIUM SERPL-MCNC: 8.1 MG/DL (ref 8.5–10.1)
CALCIUM SERPL-MCNC: 8.7 MG/DL (ref 8.5–10.1)
CHLORIDE SERPL-SCNC: 101 MMOL/L (ref 98–107)
CHLORIDE SERPL-SCNC: 102 MMOL/L (ref 98–107)
CK SERPL-CCNC: 110 U/L (ref 39–308)
CK-MB MASS: 6.4 NG/ML
CO2 SERPL-SCNC: 23 MMOL/L (ref 21–32)
CO2 SERPL-SCNC: 27 MMOL/L (ref 21–32)
CREAT SERPL-MCNC: 0.7 MG/DL (ref 0.7–1.3)
CREAT SERPL-MCNC: 0.8 MG/DL (ref 0.6–1.3)
EOSINOPHIL NFR BLD AUTO: 2 % (ref 0–5)
EOSINOPHIL NFR BLD: 0 % (ref 0–3)
EOSINOPHIL NFR BLD: 0 X10^3/UL (ref 0–0.7)
EOSINOPHIL NFR BLD: 1.2 %
ERYTHROCYTE [DISTWIDTH] IN BLOOD BY AUTOMATED COUNT: 18.1 % (ref 11.5–14.5)
GFR SERPLBLD BASED ON 1.73 SQ M-ARVRAT: 113.5 ML/MIN
GLOBULIN SER-MCNC: 3.3 G/DL (ref 2.2–3.8)
GLUCOSE SERPL-MCNC: 301 MG/DL (ref 70–99)
GLUCOSE SERPL-MCNC: 327 MG/DL (ref 70–99)
GRANULOCYTES NFR BLD MANUAL: 76.8 %
HCT VFR BLD CALC: 30.2 % (ref 39–53)
HCT VFR BLD CALC: 31 % (ref 42–52)
HGB BLD-MCNC: 9.5 GM/DL (ref 14–18)
HGB BLD-MCNC: 9.6 G/DL (ref 13–17.5)
INR PPP: 1
LIPASE: 64 U/L (ref 73–393)
LIPASE: 64 U/L (ref 73–393)
LYMPHOCYTES # BLD: 0.6 X10^3/UL (ref 1–4.8)
LYMPHOCYTES # BLD: 1.7 THOU/UL (ref 0.8–5.3)
LYMPHOCYTES NFR BLD AUTO: 16.5 %
LYMPHOCYTES NFR BLD AUTO: 6 % (ref 24–48)
MAGNESIUM SERPL-MCNC: 1.4 MG/DL (ref 1.8–2.4)
MAGNESIUM SERPL-MCNC: 1.4 MG/DL (ref 1.8–2.4)
MCH RBC QN AUTO: 20.3 PG (ref 26–34)
MCH RBC QN AUTO: 21 PG (ref 25–35)
MCHC RBC AUTO-ENTMCNC: 30.7 G/DL (ref 28–37)
MCHC RBC AUTO-ENTMCNC: 32 G/DL (ref 31–37)
MCV RBC AUTO: 66 FL (ref 79–100)
MCV RBC: 66.2 FL (ref 80–100)
MICROCYTES: (no result)
MONO #: 0.2 X10^3/UL (ref 0–1.1)
MONOCYTES NFR BLD: 2 % (ref 0–9)
MONOCYTES NFR BLD: 3.8 %
MPV: 7.2 FL. (ref 7.2–11.1)
NEUT #: 9.7 X10^3/UL (ref 1.8–7.7)
NEUTROPHILS # BLD: 7.9 THOU/UL (ref 1.6–8.1)
NEUTROPHILS NFR BLD AUTO: 92 % (ref 31–73)
NT-PRO BRAIN NAT PEPTIDE: 1454 PG/ML (ref ?–300)
NUCLEATED RBCS: 0 /100WBC
OVALOCYTES BLD QL SMEAR: (no result)
OVALOCYTES BLD QL SMEAR: (no result)
PLATELET # BLD AUTO: 469 X10^3/UL (ref 140–400)
PLATELET # BLD EST: (no result) 10*3/UL
PLATELET # BLD EST: (no result) 10*3/UL
PLATELET COUNT*: 511 THOU/UL (ref 150–400)
POIKILOCYTOSIS BLD QL SMEAR: (no result)
POLYCHROMASIA BLD QL SMEAR: SLIGHT
POTASSIUM SERPL-SCNC: 3.5 MMOL/L (ref 3.5–5.1)
POTASSIUM SERPL-SCNC: 3.8 MMOL/L (ref 3.5–5.1)
PROT SERPL-MCNC: 6.3 G/DL (ref 6.4–8.2)
PROT SERPL-MCNC: 6.5 G/DL (ref 6.4–8.2)
PROTHROMBIN TIME: 10.6 SECONDS (ref 9.2–11.5)
RBC # BLD AUTO: 4.61 X10^6/UL (ref 4.3–5.7)
RBC # BLD AUTO: 4.69 MIL/UL (ref 4.5–6)
RDW-CV: 18.4 % (ref 10.5–14.5)
SCHISTOCYTES BLD QL SMEAR: (no result)
SODIUM SERPL-SCNC: 134 MMOL/L (ref 136–145)
SODIUM SERPL-SCNC: 138 MMOL/L (ref 136–145)
TARGETS BLD QL SMEAR: (no result)
TOXIC GRANULES BLD QL SMEAR: SLIGHT
WBC # BLD AUTO: 10.3 THOU/UL (ref 4–11)
WBC # BLD AUTO: 10.6 X10^3/UL (ref 4–11)

## 2020-03-04 PROCEDURE — 71045 X-RAY EXAM CHEST 1 VIEW: CPT

## 2020-03-04 PROCEDURE — 80053 COMPREHEN METABOLIC PANEL: CPT

## 2020-03-04 PROCEDURE — 99285 EMERGENCY DEPT VISIT HI MDM: CPT

## 2020-03-04 PROCEDURE — 84484 ASSAY OF TROPONIN QUANT: CPT

## 2020-03-04 PROCEDURE — 83690 ASSAY OF LIPASE: CPT

## 2020-03-04 PROCEDURE — 36415 COLL VENOUS BLD VENIPUNCTURE: CPT

## 2020-03-04 PROCEDURE — 96374 THER/PROPH/DIAG INJ IV PUSH: CPT

## 2020-03-04 PROCEDURE — 85007 BL SMEAR W/DIFF WBC COUNT: CPT

## 2020-03-04 PROCEDURE — 83880 ASSAY OF NATRIURETIC PEPTIDE: CPT

## 2020-03-04 PROCEDURE — 82550 ASSAY OF CK (CPK): CPT

## 2020-03-04 PROCEDURE — 93005 ELECTROCARDIOGRAM TRACING: CPT

## 2020-03-04 PROCEDURE — 96375 TX/PRO/DX INJ NEW DRUG ADDON: CPT

## 2020-03-04 PROCEDURE — 85025 COMPLETE CBC W/AUTO DIFF WBC: CPT

## 2020-03-04 PROCEDURE — 83735 ASSAY OF MAGNESIUM: CPT

## 2020-03-04 NOTE — RAD
PORTABLE CHEST 1V

 

Clinical indications: Chest pain. 

 

COMPARISON: January 4, 2020.

 

Findings: No acute lung infiltrate or pleural effusion or pulmonary edema 

or lung mass is seen.  There is a line of the right apex. This most likely

represents a skinfold.  The heart size, pulmonary vasculature, mediastinum

and both renetta are unremarkable. 

 

Impression: Probable skinfold of the right apex. Recommend repeat PA chest

x-ray with smoothing out of any skin folds to exclude a pneumothorax.

 

Electronically signed by: Triston Perez MD (3/4/2020 4:06 PM) [facility]

## 2020-03-04 NOTE — EKG
[facility]

              [address] [place] [address]

Test Date:    2020               Test Time:    14:39:21

Pat Name:     BRYANNA CLAIRE              Department:   

Patient ID:   [id number]           Room:          

Gender:       M                        Technician:   

:          1955               Requested By: ANDRE HOBSON

Order Number: [id number]           Reading MD:     

                                 Measurements

Intervals                              Axis          

Rate:         92                       P:            90

MN:           166                      QRS:          -21

QRSD:         144                      T:            97

QT:           396                                    

QTc:          495                                    

                           Interpretive Statements

SINUS RHYTHM

LEFTWARD AXIS

NON SPECIFIC INTRAVENTRICULAR BLOCK

QRS(T) CONTOUR ABNORMALITY

CONSISTENT WITH ANTEROSEPTAL INFARCT

POSSIBLY RECENT

CONSIDER INFERIOR MYOCARDIAL DAMAGE

ABNORMAL ECG

RI6.01

No previous ECG available for comparison

## 2020-03-04 NOTE — PHYS DOC
Past Medical History


Past Medical History:  CAD, CHF, COPD, CVA, Diabetes-Type II, DVT, High 

Cholesterol, Hypertension, MI


Past Surgical History:  Angioplasty


Additional Past Surgical Histo:  Left ankle, exploritory abd, cardiac stent


Smoking Status:  Current Every Day Smoker


Alcohol Use:  None


Drug Use:  None





Adult General


Chief Complaint


Chief Complaint:  CHEST PAIN





[facility]


HPI





Patient is a 64  year old with history of hypertension, dyslipidemia, diabetes 

mellitus, coronary artery disease status post angioplasty, CVA, COPD, CHF, 

frequent hospitalization for chest pain who presents with complaint of chest 

pain. Patient complaining of sudden onset of nonexertional left side pressure 

chest pain that started about 30 minutes prior to arrival to ER as a constant 

pain without radiation and rated his pain 10 over 10. Patient complaining of 

shortness of breath and nausea without dizziness, palpitation, fever and chills,

cough and congestion, vomiting and diarrhea, urinary symptom. Patient states his

pain was the same as his previous chest pain and has history of cardiac problem.

Patient states he took 4 baby aspirin and because of allergy to nitroglycerin 

was not able to take nitroglycerin.





Review of Systems


Review of Systems





Constitutional: Denies fever or chills []


Eyes: Denies change in visual acuity, redness, or eye pain []


HENT: Denies nasal congestion or sore throat []


Respiratory: Denies cough, reports shortness of breath []


Cardiovascular: No additional information not addressed in HPI []


GI: Denies abdominal pain, vomiting, bloody stools or diarrhea , reports 

nausea[]


: Denies dysuria or hematuria []


Musculoskeletal: Denies back pain or joint pain []


Integument: Denies rash or skin lesions []


Neurologic: Denies headache, focal weakness or sensory changes []


Endocrine: Denies polyuria or polydipsia []





All other systems were reviewed and found to be within normal limits, except as 

documented in this note.





Current Medications


Current Medications





Current Medications








 Medications


  (Trade)  Dose


 Ordered  Sig/Yennifer  Start Time


 Stop Time Status Last Admin


Dose Admin


 


 Morphine Sulfate


  (Morphine


 Sulfate)  2 mg  1X  ONCE  3/4/20 15:15


 3/4/20 15:16 DC 3/4/20 15:54


2 MG


 


 Ondansetron HCl


  (Zofran)  4 mg  1X  ONCE  3/4/20 15:15


 3/4/20 15:16 DC 3/4/20 15:54


4 MG











Allergies


Allergies





Allergies








Coded Allergies Type Severity Reaction Last Updated Verified


 


  Iodinated Contrast Media Allergy Severe "Breathing issues" 17 Yes


 


  nitroglycerin Allergy Severe "Pretty much unresponsive right away" 1/15/16 Yes


 


  Penicillins Allergy Intermediate Hives  12/31/15 Yes


 


  codeine Allergy Intermediate  17 Yes


 


  fentanyl Allergy Intermediate rash 12/31/15 Yes


 


  ketorolac Allergy Intermediate  17 Yes


 


  methylprednisolone Allergy Intermediate  17 Yes


 


  oxycodone Allergy Intermediate  17 Yes











Physical Exam


Physical Exam








Constitutional: Well developed, well nourished, no acute distress, non-toxic 

appearance. []


HENT: Normocephalic, atraumatic.


Eyes: PERRLA, EOMI, conjunctiva normal, no discharge. [] 


Neck: Normal range of motion, no tenderness, supple, no stridor. [] 


Cardiovascular:Heart rate regular rhythm, no murmur []


Lungs & Thorax:  Bilateral breath sounds clear to auscultation []


Abdomen: Bowel sounds normal, soft, no tenderness, no masses, no pulsatile 

masses. [] 


Skin: Warm, dry, no erythema, no rash. [] 


Back: No tenderness, no CVA tenderness. [] 


Extremities: No tenderness, no cyanosis, no clubbing, ROM intact, no edema. [] 


Neurologic: Alert and oriented X 3, no focal deficits noted. []


Psychologic: Affect anxious, judgement normal, mood normal. []





Current Patient Data


Vital Signs





                                   Vital Signs








  Date Time  Temp Pulse Resp B/P (MAP) Pulse Ox O2 Delivery O2 Flow Rate FiO2


 


3/4/20 15:54   19  97 Room Air  








Lab Values





                                Laboratory Tests








Test


 3/4/20


15:46 3/4/20


16:20


 


Sodium Level


 134 mmol/L


(136-145)  L 





 


Potassium Level


 3.8 mmol/L


(3.5-5.1) 





 


Chloride Level


 101 mmol/L


() 





 


Carbon Dioxide Level


 23 mmol/L


(21-32) 





 


Anion Gap 10 (6-14)   


 


Blood Urea Nitrogen


 5 mg/dL (8-26)


L 





 


Creatinine


 0.7 mg/dL


(0.7-1.3) 





 


Estimated GFR


(Cockcroft-Gault) 113.5  


 





 


BUN/Creatinine Ratio 7 (6-20)   


 


Glucose Level


 301 mg/dL


(70-99)  H 





 


Calcium Level


 8.7 mg/dL


(8.5-10.1) 





 


Magnesium Level


 1.4 mg/dL


(1.8-2.4)  L 





 


Total Bilirubin


 0.4 mg/dL


(0.2-1.0) 





 


Aspartate Amino Transferase


(AST) 13 U/L (15-37)


L 





 


Alanine Aminotransferase (ALT)


 14 U/L (16-63)


L 





 


Alkaline Phosphatase


 110 U/L


() 





 


Creatine Kinase


 110 U/L


() 





 


Troponin I Quantitative


 < 0.017 ng/mL


(0.000-0.055) 





 


NT-Pro-B-Type Natriuretic


Peptide 1525 pg/mL


(0-124)  H 





 


Total Protein


 6.5 g/dL


(6.4-8.2) 





 


Albumin


 3.2 g/dL


(3.4-5.0)  L 





 


Albumin/Globulin Ratio 1.0 (1.0-1.7)   


 


Lipase


 64 U/L


()  L 





 


White Blood Count


 


 10.6 x10^3/uL


(4.0-11.0)


 


Red Blood Count


 


 4.61 x10^6/uL


(4.30-5.70)


 


Hemoglobin


 


 9.6 g/dL


(13.0-17.5)  L


 


Hematocrit


 


 30.2 %


(39.0-53.0)  L


 


Mean Corpuscular Volume


 


 66 fL ()


L


 


Mean Corpuscular Hemoglobin


 


 21 pg (25-35)


L


 


Mean Corpuscular Hemoglobin


Concent 


 32 g/dL


(31-37)


 


Red Cell Distribution Width


 


 18.1 %


(11.5-14.5)  H


 


Platelet Count


 


 469 x10^3/uL


(140-400)  H


 


Neutrophils (%) (Auto)  92 % (31-73)  H


 


Lymphocytes (%) (Auto)  6 % (24-48)  L


 


Monocytes (%) (Auto)  2 % (0-9)  


 


Eosinophils (%) (Auto)  0 % (0-3)  


 


Basophils (%) (Auto)  1 % (0-3)  


 


Neutrophils # (Auto)


 


 9.7 x10^3/uL


(1.8-7.7)  H


 


Lymphocytes # (Auto)


 


 0.6 x10^3/uL


(1.0-4.8)  L


 


Monocytes # (Auto)


 


 0.2 x10^3/uL


(0.0-1.1)


 


Eosinophils # (Auto)


 


 0.0 x10^3/uL


(0.0-0.7)


 


Basophils # (Auto)


 


 0.1 x10^3/uL


(0.0-0.2)


 


Platelet Estimate  Pending  





                                Laboratory Tests


3/4/20 16:20








                                Laboratory Tests


3/4/20 15:46











EKG


EKG


EKG interpreted by me. EKG at 1439 showed normal sinus rhythm at rate of 92, 

left ugarte axis, normal OR interval, nonspecific intraventricular block, poor R-

wave progress in anteroseptal leads, no acute ST and T-wave elevation, change 

EKG from 2019





Radiology/Procedures


Radiology/Procedures


                            [facility]


                    [address] [place] [address]


                                 [phone]


                                        


                                 IMAGING REPORT





                                     Signed





PATIENT: BRYANNA CLAIRE    ACCOUNT: [id number]     MRN#: [id number]


: 1955           LOCATION: ER              AGE: 64


SEX: M                    EXAM DT: 20         ACCESSION#: [id number]


STATUS: REG ER            ORD. PHYSICIAN: ANDRE HOBSON MD


REASON: chest pain


PROCEDURE: PORTABLE CHEST 1V





PORTABLE CHEST 1V


 


Clinical indications: Chest pain. 


 


COMPARISON: 2020.


 


Findings: No acute lung infiltrate or pleural effusion or pulmonary edema 


or lung mass is seen.  There is a line of the right apex. This most likely


represents a skinfold.  The heart size, pulmonary vasculature, mediastinum


and both renetta are unremarkable. 


 


Impression: Probable skinfold of the right apex. Recommend repeat PA chest


x-ray with smoothing out of any skin folds to exclude a pneumothorax.


 


Electronically signed by: Salome Perez MD (3/4/2020 4:06 PM) [facility] [place]














DICTATED and SIGNED BY:     SALOME PEREZ MD


DATE:     20 5072





Course & Med Decision Making


Course & Med Decision Making


Pertinent Labs and Imaging studies reviewed. (See chart for details)





Evaluation of patient in ER showed 64-year-old male patient with heart score of 

frequent emergency room visits complaining of chest pain that started 3 minutes 

prior to arrival and rated his pain 10 over 10 when he looked very comfortable. 

Patient decided to leave AMA and even I told him about magnesium of 1.4 and 

needs for treatment, he didn't want to have prescription or stay in ER for 

treatment of hypomagnesemia. Patient was advised to follow up with his primary 

care physician or return to ER if does not feel better.





Dragon Disclaimer


Dragon Disclaimer


This electronic medical record was generated, in whole or in part, using a voice

 recognition dictation system.





Departure


Departure


Impression:  


   Primary Impression:  


   Chest pain


   Additional Impressions:  


   Hypomagnesemia


   Left against medical advice


   Uncontrolled diabetes mellitus


Disposition:  07 AGAINST MEDICAL ADVICE (at 1640)


Condition:  STABLE


Referrals:  


UNKNOWN PCP NAME (PCP)





The HEART Score for CP Pts


HEART Score for Chest Pain:  








HEART Score for Chest Pain Response (Comments) Value


 


History Slighlty/Non-Suspicious 0


 


ECG Nonspecific Repolarizatio 1


 


Age >45 - < 65 1


 


Risk Factors >3 Risk Factors or Hx CAD 2


 


Troponin < Normal Limit 0


 


Total  4








Risk Factors:


Risk Factors:  DM, Current or recent (<one month) smoker, HTN, HLP, family 

history of CAD, obesity.


Risk Scores:


Score 0 - 3:  2.5% MACE over next 6 weeks - Discharge Home


Score 4 - 6:  20.3% MACE over next 6 weeks - Admit for Clinical Observation


Score 7 - 10:  72.7% MACE over next 6 weeks - Early Invasive Strategies





Problem Qualifiers








   Primary Impression:  


   Chest pain


   Chest pain type:  unspecified  Qualified Codes:  R07.9 - Chest pain, 

   unspecified


   Additional Impressions:  


   Uncontrolled diabetes mellitus


   Diabetes mellitus type:  other specified (including FELIX)  Glycemic state:  

   with hyperglycemia  Qualified Codes:  E13.65 - Other specified diabetes 

   mellitus with hyperglycemia








ANDRE HOBSON MD              Mar 4, 2020 15:29

## 2020-03-04 NOTE — EKG
[place] [address]
Phone:  [phone]                     ELECTROCARDIOGRAM REPORT      
_______________________________________________________________________________
 
Name:         DANDRE SCHULTZ                 Room:                     [facility]#:    [id number]     Account #:     [id number]  
Admission:    20    Attend Phys:                     
Discharge:    20    Date of Birth: [birth year]  
Date of Service: 20 1235  Report #:      [id number]
        [id number]
_______________________________________________________________________________
THIS REPORT FOR:  //name//                      
 
                         [facility] ED
                                       
Test Date:    2020               Test Time:    12:35:42
Pat Name:     DANDRE SCHULTZ              Department:   
Patient ID:   [id number]            Room:          
Gender:                               Technician:   
:          1955               Requested By: Enrique West
Order Number: [id number] MD:   Miguel Angel Hoff
                                 Measurements
Intervals                              Axis          
Rate:         90                       P:            90
NM:           191                      QRS:          -38
QRSD:         140                      T:            117
QT:           405                                    
QTc:          496                                    
                           Interpretive Statements
Sinus rhythm
Left bundle branch block
Baseline wander in lead(s) V5
Compared to ECG 2020 12:08:41
No significant changes
 
Electronically Signed On 3-4-2020 16:51:06 CST by Miguel Angel Hoff
[email or web address]
 
 
 
 
 
 
 
 
 
 
 
 
 
 
 
 
 
 
  <ELECTRONICALLY SIGNED>
                                           By: Miguel Angel Hoff MD, [facility]      
  20     1651
D: 20 1235   _____________________________________
T: 20 1235   Miguel Angel Hoff MD, [facility]        /EPI

## 2022-06-30 NOTE — EKG
[facility] [address], [place] [address]

Test Date:    2017               Test Time:    20:26:10

Pat Name:     SAMMIE MONCADA              Department:   

Patient ID:   [id number]           Room:          

Gender:       M                        Technician:   

:          1955               Requested By: JUDY SINGH

Order Number: [id number]            Reading MD:     

                                 Measurements

Intervals                              Axis          

Rate:         86                       P:            0

SD:           156                      QRS:          82

QRSD:         86                       T:            -18

QT:           372                                    

QTc:          448                                    

                           Interpretive Statements

SINUS RHYTHM

R-S TRANSITION ZONE IN V LEADS DISPLACED TO THE LEFT

QRS(T) CONTOUR ABNORMALITY

CANNOT RULE OUT ANTEROSEPTAL MYOCARDIAL DAMAGE

ST & T ABNORMALITY, CONSIDER

INFERIOR ISCHEMIA OR LEFT VENTRICULAR STRAIN

RI6.01          Unconfirmed report

No previous ECG available for comparison
[facility] [address], [place] [address]

Test Date:    2017               Test Time:    21:29:06

Pat Name:     SAMMIE MONCADA              Department:   

Patient ID:   [id number]           Room:          

Gender:       M                        Technician:   

:          1955               Requested By: JUDY SINGH

Order Number: [id number]            Reading MD:     

                                 Measurements

Intervals                              Axis          

Rate:         81                       P:            69

AL:           188                      QRS:          78

QRSD:         86                       T:            93

QT:           392                                    

QTc:          461                                    

                           Interpretive Statements

SINUS RHYTHM

R-S TRANSITION ZONE IN V LEADS DISPLACED TO THE LEFT

OTHERWISE NORMAL ECG

RI6.01          Unconfirmed report

No previous ECG available for comparison
FAMILY HISTORY:  FHx: stomach cancer